# Patient Record
Sex: FEMALE | Race: BLACK OR AFRICAN AMERICAN | URBAN - METROPOLITAN AREA
[De-identification: names, ages, dates, MRNs, and addresses within clinical notes are randomized per-mention and may not be internally consistent; named-entity substitution may affect disease eponyms.]

---

## 2024-01-26 ENCOUNTER — OFFICE VISIT (OUTPATIENT)
Dept: SURGERY | Facility: CLINIC | Age: 64
End: 2024-01-26
Payer: COMMERCIAL

## 2024-01-26 VITALS
WEIGHT: 278.19 LBS | BODY MASS INDEX: 44.18 KG/M2 | DIASTOLIC BLOOD PRESSURE: 78 MMHG | SYSTOLIC BLOOD PRESSURE: 124 MMHG | HEIGHT: 66.5 IN

## 2024-01-26 DIAGNOSIS — Z90.711 HISTORY OF ABDOMINAL SUPRACERVICAL SUBTOTAL HYSTERECTOMY: ICD-10-CM

## 2024-01-26 DIAGNOSIS — Z01.419 WOMEN'S ANNUAL ROUTINE GYNECOLOGICAL EXAMINATION: Primary | ICD-10-CM

## 2024-01-26 DIAGNOSIS — Z12.31 BREAST CANCER SCREENING BY MAMMOGRAM: ICD-10-CM

## 2024-01-26 DIAGNOSIS — Z12.4 SCREENING FOR CERVICAL CANCER: ICD-10-CM

## 2024-01-26 DIAGNOSIS — R23.2 HOT FLASHES: ICD-10-CM

## 2024-01-26 PROCEDURE — 3008F BODY MASS INDEX DOCD: CPT | Performed by: OBSTETRICS & GYNECOLOGY

## 2024-01-26 PROCEDURE — 99386 PREV VISIT NEW AGE 40-64: CPT | Performed by: OBSTETRICS & GYNECOLOGY

## 2024-01-26 PROCEDURE — 99204 OFFICE O/P NEW MOD 45 MIN: CPT | Performed by: OBSTETRICS & GYNECOLOGY

## 2024-01-26 PROCEDURE — 3074F SYST BP LT 130 MM HG: CPT | Performed by: OBSTETRICS & GYNECOLOGY

## 2024-01-26 PROCEDURE — 3078F DIAST BP <80 MM HG: CPT | Performed by: OBSTETRICS & GYNECOLOGY

## 2024-01-26 PROCEDURE — 88175 CYTOPATH C/V AUTO FLUID REDO: CPT | Performed by: OBSTETRICS & GYNECOLOGY

## 2024-01-26 PROCEDURE — 87624 HPV HI-RISK TYP POOLED RSLT: CPT | Performed by: OBSTETRICS & GYNECOLOGY

## 2024-01-26 RX ORDER — FLUTICASONE PROPIONATE AND SALMETEROL XINAFOATE 230; 21 UG/1; UG/1
2 AEROSOL, METERED RESPIRATORY (INHALATION) EVERY 12 HOURS
COMMUNITY
Start: 2022-12-05

## 2024-01-26 RX ORDER — HYDROCHLOROTHIAZIDE 25 MG/1
25 TABLET ORAL EVERY MORNING
COMMUNITY
Start: 2024-01-02

## 2024-01-26 RX ORDER — SEMAGLUTIDE 0.68 MG/ML
0.25 INJECTION, SOLUTION SUBCUTANEOUS
COMMUNITY
Start: 2023-04-15

## 2024-01-26 RX ORDER — AMLODIPINE BESYLATE 10 MG/1
10 TABLET ORAL NIGHTLY
COMMUNITY

## 2024-01-26 RX ORDER — ALBUTEROL SULFATE 90 UG/1
2 AEROSOL, METERED RESPIRATORY (INHALATION)
COMMUNITY
Start: 2022-12-05

## 2024-01-26 RX ORDER — CONJUGATED ESTROGENS 0.62 MG/1
0.62 TABLET, FILM COATED ORAL DAILY
COMMUNITY

## 2024-01-26 RX ORDER — EMPAGLIFLOZIN 10 MG/1
10 TABLET, FILM COATED ORAL DAILY
COMMUNITY

## 2024-01-26 RX ORDER — MONTELUKAST SODIUM 10 MG/1
1 TABLET ORAL NIGHTLY
COMMUNITY
Start: 2023-07-30

## 2024-01-26 RX ORDER — NEBIVOLOL 20 MG/1
20 TABLET ORAL DAILY
COMMUNITY

## 2024-01-26 NOTE — PROGRESS NOTES
NEW GYN H&P     2024  1:14 PM    Chief Complaint   Patient presents with    New Patient     Annual exam/pap     Mammogram Screening     Needs mammo 2024    Other     Concerned about family history of breast cancer.     Night Sweats     Pt c/o hot flashes    .    HPI: Patient is a 63 year old  LMP supracervical hysterectomy here to re-establish care - due for annual exam, PAP, and order for mammogram. Reports concerns about hot flashes since previous provider recently discontinued premarin HRT after many years of use. Flashes are drenching and intense since discontinuation. Recommended trial with non-hormonal hot flash remedy of Sofia 500 mg 1-3 capsules daily along with lifestyle modifications using iced towels to neck and head along with mini-Vornado fan at bedside nightstand. Will call if no improvement in 4-6 weeks to discuss additional treatment options. No other gynecologic concerns or complaints. No pelvic pain. No abnormal vaginal discharge or bleeding.     No LMP recorded. Patient has had a hysterectomy.    OB History    Para Term  AB Living   3 2 2   1 2   SAB IAB Ectopic Multiple Live Births   0 1     2      # Outcome Date GA Lbr Clyde/2nd Weight Sex Delivery Anes PTL Lv   3 Term     M NORMAL SPONT   MEHNAZ   2 Term 76    F NORMAL SPONT   MEHNAZ   1 IAB      THERAPEUTIC          GYN hx:    Hx Prior Abnormal Pap: No  Pap Date: 18  Pap Result Notes: wnl  Follow Up Recommendation: Last Mammo: 2023 @ [facility]  CONTRACEPTION: N/A  LAST MAMMOGRAM: 2023      Current Outpatient Medications   Medication Sig Dispense Refill    fluticasone-salmeterol (ADVAIR HFA) 230-21 MCG/ACT Inhalation Aerosol Inhale 2 puffs into the lungs Q12H.      albuterol 108 (90 Base) MCG/ACT Inhalation Aero Soln Inhale 2 puffs into the lungs.      montelukast 10 MG Oral Tab Take 1 tablet (10 mg total) by mouth nightly.      JARDIANCE 10 MG Oral Tab Take 1 tablet (10 mg total) by mouth daily.       semaglutide (OZEMPIC, 0.25 OR 0.5 MG/DOSE,) 2 MG/3ML Subcutaneous Solution Pen-injector Inject 0.25 mg into the skin.      Nebivolol HCl 20 MG Oral Tab Take 1 tablet (20 mg total) by mouth daily.      amLODIPine 10 MG Oral Tab Take 1 tablet (10 mg total) by mouth nightly.      hydroCHLOROthiazide 25 MG Oral Tab Take 1 tablet (25 mg total) by mouth every morning.      PREMARIN 0.625 MG Oral Tab Take 1 tablet (0.625 mg total) by mouth daily.         Past Medical History:   Diagnosis Date    Asthma     COVID 12/2023    Diabetes mellitus (HCC)     Exposure to TB     Family history of breast cancer     with Mat. aunt and mat. cousin    Fibroids     Hypertension      Past Surgical History:   Procedure Laterality Date    HYSTERECTOMY      part. hyst    IR UTERINE FIBROID EMBOLIZATION      LAPAROSCOPY,PELVIC,BIOPSY      to remove IUD    OTHER SURGICAL HISTORY      large cyst removed from back     Allergies   Allergen Reactions    Cefuroxime OTHER (SEE COMMENTS), SWELLING and UNKNOWN     Makes throat and face swell    Tongue swelling    Ace Inhibitors OTHER (SEE COMMENTS) and UNKNOWN     Makes throat and face swell    Trouble breathing and swelling of the throat   Makes throat and face swell    Chlorpheniramine-Phenylephrine UNKNOWN     Dusts up    Uncaria Tomentosa, Cats Claw OTHER (SEE COMMENTS) and UNKNOWN    Cat Hair Extract Coughing     Swelling    Cholestatin Coughing    Milk-Related Compounds DIARRHEA     Family History   Problem Relation Age of Onset    Prostate Cancer Father     Diabetes Father     Diabetes Mother     Breast Cancer Maternal Aunt     Pancreatic Cancer Maternal Aunt     Cancer Maternal Aunt     Breast Cancer Maternal Cousin     Colon Cancer Neg     Uterine Cancer Neg     Ovarian Cancer Neg      Social History     Tobacco Use    Smoking status: Never    Smokeless tobacco: Never   Substance and Sexual Activity    Alcohol use: Yes     Comment: occ    Drug use: Never    Sexual activity: Yes     Partners:  Male     Social History     Social History Narrative    Not on file       ROS:     Review of Systems:  A comprehensive 10 point ROS was completed. All pertinent positives and negatives noted in the HPI.     /78   Ht 66.5\"   Wt 278 lb 3.2 oz (126.2 kg)   BMI 44.23 kg/m²     Exam:   GENERAL: well developed, well nourished, in no apparent distress  SKIN: no rashes, no lesions  HEENT: normal  LUNGS: respiration unlabored  CARDIOVASCULAR: no peripheral edema or varicosities, skin warm and dry  BREASTS: bilaterally nontender, no palpable masses, no nipple discharge, no skin changes, no axillary adenopathy  ABDOMEN: Soft, non distended; non tender, no masses  GYNE/:   External Genitalia: normal, no lesions, good perineal support  Urethra: meatus normal   Bladder: well supported  Vagina: normal mucosa, no lesions, no discharge   Uterus: absent  Cervix: normal os, no lesions or bleeding  Adnexa: absent  Cul-de-sac: normal  R/V: normal perineum, no hemorrhoids  EXTREMITIES:  nontender without edema      A/P: Patient is 63 year old female     1. Women's annual routine gynecological examination  - Normal    2. Hot flashes - recent ERT discontinued  - Start oral Sofia 500 mg 1-3 tablets daily  - Bedside fan  - Call if no improvement in 6 weeks    3. History of abdominal supracervical subtotal hysterectomy  - PAP today    4. Breast cancer screening by mammogram  - Mammogram ordered      Total time spent = 45 minutes  >50% visit = face to face counseling and coordination of care        1/26/2024  Delaney Keller MD

## 2024-01-29 LAB — HPV I/H RISK 1 DNA SPEC QL NAA+PROBE: NEGATIVE

## 2024-02-01 ENCOUNTER — LAB ENCOUNTER (OUTPATIENT)
Dept: LAB | Facility: REFERENCE LAB | Age: 64
End: 2024-02-01
Attending: NURSE PRACTITIONER
Payer: COMMERCIAL

## 2024-02-01 ENCOUNTER — OFFICE VISIT (OUTPATIENT)
Dept: FAMILY MEDICINE CLINIC | Facility: CLINIC | Age: 64
End: 2024-02-01
Payer: COMMERCIAL

## 2024-02-01 VITALS
BODY MASS INDEX: 43.83 KG/M2 | SYSTOLIC BLOOD PRESSURE: 132 MMHG | OXYGEN SATURATION: 98 % | HEIGHT: 66.5 IN | TEMPERATURE: 97 F | WEIGHT: 276 LBS | DIASTOLIC BLOOD PRESSURE: 84 MMHG | HEART RATE: 68 BPM

## 2024-02-01 DIAGNOSIS — Z85.43 HISTORY OF OVARIAN CANCER: ICD-10-CM

## 2024-02-01 DIAGNOSIS — E11.9 TYPE 2 DIABETES MELLITUS WITHOUT COMPLICATION, WITHOUT LONG-TERM CURRENT USE OF INSULIN (HCC): ICD-10-CM

## 2024-02-01 DIAGNOSIS — E87.6 HYPOKALEMIA: ICD-10-CM

## 2024-02-01 DIAGNOSIS — I10 ESSENTIAL HYPERTENSION, BENIGN: ICD-10-CM

## 2024-02-01 DIAGNOSIS — Z12.11 SCREENING FOR MALIGNANT NEOPLASM OF COLON: ICD-10-CM

## 2024-02-01 DIAGNOSIS — Z23 ENCOUNTER FOR IMMUNIZATION: ICD-10-CM

## 2024-02-01 DIAGNOSIS — E55.9 VITAMIN D DEFICIENCY: ICD-10-CM

## 2024-02-01 DIAGNOSIS — Z00.00 ADULT GENERAL MEDICAL EXAMINATION: ICD-10-CM

## 2024-02-01 DIAGNOSIS — Z00.00 ADULT GENERAL MEDICAL EXAMINATION: Primary | ICD-10-CM

## 2024-02-01 DIAGNOSIS — J45.30 MILD PERSISTENT ASTHMA WITHOUT COMPLICATION: ICD-10-CM

## 2024-02-01 DIAGNOSIS — Z12.31 ENCOUNTER FOR SCREENING MAMMOGRAM FOR MALIGNANT NEOPLASM OF BREAST: ICD-10-CM

## 2024-02-01 PROBLEM — D49.59: Status: RESOLVED | Noted: 2024-02-01 | Resolved: 2024-02-01

## 2024-02-01 PROBLEM — D49.59: Status: ACTIVE | Noted: 2024-02-01

## 2024-02-01 PROBLEM — R03.0 FINDING OF ABOVE NORMAL BLOOD PRESSURE: Status: RESOLVED | Noted: 2024-02-01 | Resolved: 2024-02-01

## 2024-02-01 PROBLEM — J30.2 SEASONAL ALLERGIC RHINITIS: Status: ACTIVE | Noted: 2017-04-10

## 2024-02-01 PROBLEM — Z01.419 WOMEN'S ANNUAL ROUTINE GYNECOLOGICAL EXAMINATION: Status: RESOLVED | Noted: 2024-01-26 | Resolved: 2024-02-01

## 2024-02-01 PROBLEM — R03.0 FINDING OF ABOVE NORMAL BLOOD PRESSURE: Status: ACTIVE | Noted: 2024-02-01

## 2024-02-01 PROBLEM — N95.1 SYMPTOMATIC MENOPAUSAL OR FEMALE CLIMACTERIC STATES: Status: ACTIVE | Noted: 2022-01-18

## 2024-02-01 LAB
ALBUMIN SERPL-MCNC: 4.7 G/DL (ref 3.2–4.8)
ALBUMIN/GLOB SERPL: 1.4 {RATIO} (ref 1–2)
ALP LIVER SERPL-CCNC: 51 U/L
ALT SERPL-CCNC: 15 U/L
ANION GAP SERPL CALC-SCNC: 9 MMOL/L (ref 0–18)
AST SERPL-CCNC: 17 U/L (ref ?–34)
BASOPHILS # BLD AUTO: 0.04 X10(3) UL (ref 0–0.2)
BASOPHILS NFR BLD AUTO: 0.8 %
BILIRUB SERPL-MCNC: 0.7 MG/DL (ref 0.2–1.1)
BUN BLD-MCNC: 9 MG/DL (ref 9–23)
BUN/CREAT SERPL: 13.2 (ref 10–20)
CALCIUM BLD-MCNC: 10.5 MG/DL (ref 8.7–10.4)
CHLORIDE SERPL-SCNC: 106 MMOL/L (ref 98–112)
CHOLEST SERPL-MCNC: 166 MG/DL (ref ?–200)
CO2 SERPL-SCNC: 25 MMOL/L (ref 21–32)
CREAT BLD-MCNC: 0.68 MG/DL
CREAT UR-SCNC: 86.8 MG/DL
DEPRECATED RDW RBC AUTO: 41.5 FL (ref 35.1–46.3)
EGFRCR SERPLBLD CKD-EPI 2021: 98 ML/MIN/1.73M2 (ref 60–?)
EOSINOPHIL # BLD AUTO: 0.14 X10(3) UL (ref 0–0.7)
EOSINOPHIL NFR BLD AUTO: 2.8 %
ERYTHROCYTE [DISTWIDTH] IN BLOOD BY AUTOMATED COUNT: 12.7 % (ref 11–15)
EST. AVERAGE GLUCOSE BLD GHB EST-MCNC: 169 MG/DL (ref 68–126)
FASTING PATIENT LIPID ANSWER: YES
FASTING STATUS PATIENT QL REPORTED: YES
GLOBULIN PLAS-MCNC: 3.4 G/DL (ref 2.8–4.4)
GLUCOSE BLD-MCNC: 183 MG/DL (ref 70–99)
HBA1C MFR BLD: 7.5 % (ref ?–5.7)
HCT VFR BLD AUTO: 39.9 %
HDLC SERPL-MCNC: 61 MG/DL (ref 40–59)
HGB BLD-MCNC: 13.1 G/DL
IMM GRANULOCYTES # BLD AUTO: 0.02 X10(3) UL (ref 0–1)
IMM GRANULOCYTES NFR BLD: 0.4 %
LDLC SERPL CALC-MCNC: 87 MG/DL (ref ?–100)
LYMPHOCYTES # BLD AUTO: 1.75 X10(3) UL (ref 1–4)
LYMPHOCYTES NFR BLD AUTO: 34.5 %
MCH RBC QN AUTO: 29.2 PG (ref 26–34)
MCHC RBC AUTO-ENTMCNC: 32.8 G/DL (ref 31–37)
MCV RBC AUTO: 88.9 FL
MICROALBUMIN UR-MCNC: <0.3 MG/DL
MONOCYTES # BLD AUTO: 0.34 X10(3) UL (ref 0.1–1)
MONOCYTES NFR BLD AUTO: 6.7 %
NEUTROPHILS # BLD AUTO: 2.78 X10 (3) UL (ref 1.5–7.7)
NEUTROPHILS # BLD AUTO: 2.78 X10(3) UL (ref 1.5–7.7)
NEUTROPHILS NFR BLD AUTO: 54.8 %
NONHDLC SERPL-MCNC: 105 MG/DL (ref ?–130)
OSMOLALITY SERPL CALC.SUM OF ELEC: 293 MOSM/KG (ref 275–295)
PLATELET # BLD AUTO: 193 10(3)UL (ref 150–450)
POTASSIUM SERPL-SCNC: 3.4 MMOL/L (ref 3.5–5.1)
PROT SERPL-MCNC: 8.1 G/DL (ref 5.7–8.2)
RBC # BLD AUTO: 4.49 X10(6)UL
SODIUM SERPL-SCNC: 140 MMOL/L (ref 136–145)
TRIGL SERPL-MCNC: 101 MG/DL (ref 30–149)
TSI SER-ACNC: 1.14 MIU/ML (ref 0.55–4.78)
VIT D+METAB SERPL-MCNC: 31.1 NG/ML (ref 30–100)
VLDLC SERPL CALC-MCNC: 16 MG/DL (ref 0–30)
WBC # BLD AUTO: 5.1 X10(3) UL (ref 4–11)

## 2024-02-01 PROCEDURE — 84443 ASSAY THYROID STIM HORMONE: CPT

## 2024-02-01 PROCEDURE — 36415 COLL VENOUS BLD VENIPUNCTURE: CPT

## 2024-02-01 PROCEDURE — 83036 HEMOGLOBIN GLYCOSYLATED A1C: CPT

## 2024-02-01 PROCEDURE — 80053 COMPREHEN METABOLIC PANEL: CPT

## 2024-02-01 PROCEDURE — 90686 IIV4 VACC NO PRSV 0.5 ML IM: CPT | Performed by: NURSE PRACTITIONER

## 2024-02-01 PROCEDURE — 99396 PREV VISIT EST AGE 40-64: CPT | Performed by: NURSE PRACTITIONER

## 2024-02-01 PROCEDURE — 82306 VITAMIN D 25 HYDROXY: CPT

## 2024-02-01 PROCEDURE — 80061 LIPID PANEL: CPT

## 2024-02-01 PROCEDURE — 82570 ASSAY OF URINE CREATININE: CPT

## 2024-02-01 PROCEDURE — 82043 UR ALBUMIN QUANTITATIVE: CPT

## 2024-02-01 PROCEDURE — 85025 COMPLETE CBC W/AUTO DIFF WBC: CPT

## 2024-02-01 PROCEDURE — 99214 OFFICE O/P EST MOD 30 MIN: CPT | Performed by: NURSE PRACTITIONER

## 2024-02-01 PROCEDURE — 90471 IMMUNIZATION ADMIN: CPT | Performed by: NURSE PRACTITIONER

## 2024-02-01 RX ORDER — ALBUTEROL SULFATE 90 UG/1
2 AEROSOL, METERED RESPIRATORY (INHALATION) EVERY 6 HOURS PRN
Qty: 1 EACH | Refills: 3 | Status: SHIPPED | OUTPATIENT
Start: 2024-02-01

## 2024-02-01 RX ORDER — MONTELUKAST SODIUM 10 MG/1
10 TABLET ORAL NIGHTLY
Qty: 90 TABLET | Refills: 1 | Status: SHIPPED | OUTPATIENT
Start: 2024-02-01

## 2024-02-01 RX ORDER — POTASSIUM CHLORIDE 1500 MG/1
20 TABLET, EXTENDED RELEASE ORAL DAILY
Qty: 3 TABLET | Refills: 0 | Status: SHIPPED | OUTPATIENT
Start: 2024-02-01 | End: 2024-02-04

## 2024-02-01 RX ORDER — HYDROCHLOROTHIAZIDE 25 MG/1
25 TABLET ORAL EVERY MORNING
Qty: 90 TABLET | Refills: 1 | Status: SHIPPED | OUTPATIENT
Start: 2024-02-01

## 2024-02-01 RX ORDER — AMLODIPINE BESYLATE 10 MG/1
10 TABLET ORAL NIGHTLY
Qty: 90 TABLET | Refills: 1 | Status: SHIPPED | OUTPATIENT
Start: 2024-02-01

## 2024-02-01 RX ORDER — EMPAGLIFLOZIN 10 MG/1
10 TABLET, FILM COATED ORAL DAILY
Qty: 90 TABLET | Refills: 1 | Status: SHIPPED | OUTPATIENT
Start: 2024-02-01

## 2024-02-01 RX ORDER — FLUTICASONE PROPIONATE AND SALMETEROL XINAFOATE 230; 21 UG/1; UG/1
2 AEROSOL, METERED RESPIRATORY (INHALATION) EVERY 12 HOURS
Qty: 1 EACH | Refills: 6 | Status: SHIPPED | OUTPATIENT
Start: 2024-02-01

## 2024-02-01 RX ORDER — NEBIVOLOL 20 MG/1
20 TABLET ORAL DAILY
Qty: 90 TABLET | Refills: 1 | Status: SHIPPED | OUTPATIENT
Start: 2024-02-01

## 2024-02-01 NOTE — PROGRESS NOTES
Chief Complaint:   Chief Complaint   Patient presents with    Establish Care       HPI:   This is a 63 year old female coming in for physical, to establish care, and for several concerns. Hx of DM2, on Jardiance, does not monitor glucose regularly. Due for A1C, eye exam, urine for microalbumin/creatinine. Was prescribed Ozempic at one point by a previous provider but never took it. She is interested in considering an injectable medicine in the future. Hx HTN, BP stable. Reports good compliance with medicines.     Has been out of her asthma medication for a few weeks now. Feels like she coughs sometimes, denies dyspnea or nighttime awakenings with symptoms. Had not been using Singulair regularly because she thought it was not for asthma. Hx ovarian cancer s/p oopherectomy, sees gynecology. Due for mammogram, colon cancer screening.     Results for orders placed or performed in visit on 01/26/24   Hpv Dna  High Risk , Thin Prep Collect   Result Value Ref Range    HPV High Risk Negative Negative    HPV Source Vaginal/cervical              Past Medical History:   Diagnosis Date    Asthma     Biliary calculus     COVID 12/2023    Diabetes mellitus (HCC)     Exposure to TB     Family history of breast cancer     with Mat. aunt and mat. cousin    Fibroids     Hypertension     Ovarian cancer (HCC)      Past Surgical History:   Procedure Laterality Date    HYSTERECTOMY      supracervical; fibroids + borderline mucinous ovarian    IR UTERINE FIBROID EMBOLIZATION      LAPAROSCOPY,PELVIC,BIOPSY      to remove IUD    OTHER SURGICAL HISTORY      large cyst removed from back     Social History:  Social History     Socioeconomic History    Marital status: Single   Tobacco Use    Smoking status: Never    Smokeless tobacco: Never   Substance and Sexual Activity    Alcohol use: Yes     Comment: occ    Drug use: Never    Sexual activity: Yes     Partners: Male     Family History:  Family History   Problem Relation Age of Onset     Prostate Cancer Father     Diabetes Father     Diabetes Mother     Breast Cancer Maternal Aunt     Pancreatic Cancer Maternal Aunt     Cancer Maternal Aunt     Breast Cancer Maternal Cousin     Colon Cancer Neg     Uterine Cancer Neg     Ovarian Cancer Neg      Allergies:  Allergies   Allergen Reactions    Cefuroxime OTHER (SEE COMMENTS), SWELLING and UNKNOWN     Makes throat and face swell    Tongue swelling    Ace Inhibitors OTHER (SEE COMMENTS) and UNKNOWN     Makes throat and face swell    Trouble breathing and swelling of the throat   Makes throat and face swell    Chlorpheniramine-Phenylephrine UNKNOWN     Dusts up    Uncaria Tomentosa, Cats Claw OTHER (SEE COMMENTS) and UNKNOWN    Cat Hair Extract Coughing     Swelling    Cholestatin Coughing    Milk-Related Compounds DIARRHEA    Nickel RASH     Current Meds:  Current Outpatient Medications   Medication Sig Dispense Refill    fluticasone-salmeterol (ADVAIR HFA) 230-21 MCG/ACT Inhalation Aerosol Inhale 2 puffs into the lungs Q12H. 1 each 6    albuterol 108 (90 Base) MCG/ACT Inhalation Aero Soln Inhale 2 puffs into the lungs every 6 (six) hours as needed for Wheezing. 1 each 3    amLODIPine 10 MG Oral Tab Take 1 tablet (10 mg total) by mouth nightly. 90 tablet 1    Nebivolol HCl 20 MG Oral Tab Take 1 tablet (20 mg total) by mouth daily. 90 tablet 1    hydroCHLOROthiazide 25 MG Oral Tab Take 1 tablet (25 mg total) by mouth every morning. 90 tablet 1    JARDIANCE 10 MG Oral Tab Take 1 tablet (10 mg total) by mouth daily. 90 tablet 1    montelukast 10 MG Oral Tab Take 1 tablet (10 mg total) by mouth nightly. 90 tablet 1      Counseling given: Not Answered       REVIEW OF SYSTEMS:   CONSTITUTIONAL:  Denies unusual weight gain/loss, fever, chills, or fatigue.  HEENT:  Eyes:  Denies eye pain, visual loss, blurred vision. Denies hearing loss, sneezing, congestion, runny nose or sore throat.  INTEGUMENTARY:  Denies rashes, itching, skin lesion.  CARDIOVASCULAR:  Denies  chest pain, palpitations, edema, dyspnea on exertion or at rest.  RESPIRATORY:  Denies shortness of breath, wheezing. Coughs at times since being out of asthma medicine, drinks water and this goes away.  GASTROINTESTINAL:  Denies abdominal pain, nausea, vomiting, constipation, diarrhea, or blood in stool.  GENITOURINARY: Denies dysuria, hematuria, frequency.  MUSCULOSKELETAL:  Denies weakness, muscle aches, back pain, joint pain, swelling or stiffness.  NEUROLOGICAL:  Denies headache, seizures, dizziness.  PSYCHIATRIC:  Denies depression or anxiety. Denies suicidal thoughts.    EXAM:   /84 (BP Location: Right arm, Patient Position: Sitting, Cuff Size: large)   Pulse 68   Temp 97.1 °F (36.2 °C) (Temporal)   Ht 5' 6.5\" (1.689 m)   Wt 276 lb (125.2 kg)   SpO2 98%   BMI 43.88 kg/m²  Estimated body mass index is 43.88 kg/m² as calculated from the following:    Height as of this encounter: 5' 6.5\" (1.689 m).    Weight as of this encounter: 276 lb (125.2 kg).   Vital signs reviewed.Appears stated age, well groomed, in no acute distress.  Physical Exam:  GEN:  Patient is alert, awake and oriented, well developed, well nourished.  HEENT:  Head:  Normocephalic, atraumatic Eyes: EOMI, PERRLA, conjunctivae clear bilaterally, no eye discharge Ears: External normal, TM clear bilaterally. Nose: patent, no nasal discharge. Throat:  No tonsillar erythema or exudate.  Mouth:  No oral lesions, good dentition.  NECK: Supple, no CLAD, no thyromegaly.  SKIN: No rashes, no skin lesion, no bruising, good turgor.  HEART:  Regular rate and rhythm, no murmurs, rubs or gallops.  LUNGS: Clear to auscultation bilterally, no rales/rhonchi/wheezing.  ABDOMEN:  Soft, nondistended, nontender, bowel sounds normal in all 4 quadrants, no masses, no hepatosplenomegaly.  BACK: No tenderness to palpation, FROM.  EXTREMITIES:  No edema, no cyanosis, no clubbing, FROM, 2+ dorsalis pedis pulses bilaterally.  NEURO:  No deficit, normal gait,  strength and tone, sensory intact, normal reflexes.    ASSESSMENT AND PLAN:   1. Adult general medical examination  -Healthy diet and regular exercise.  -Annual eye exam and biannual dental visits.  -Labs as below.  - CBC With Differential With Platelet; Future  - Comp Metabolic Panel (14); Future  - Hemoglobin A1C; Future  - Lipid Panel; Future  - Assay, Thyroid Stim Hormone; Future    2. Type 2 diabetes mellitus without complication, without long-term current use of insulin (HCC)  -Will CPM for now. Check A1C, urine test today. Referred to ophthalmology.  -Diet/exercise reinforced.  -See me 2 weeks to review test results and discuss possible injectable medication.  - JARDIANCE 10 MG Oral Tab; Take 1 tablet (10 mg total) by mouth daily.  Dispense: 90 tablet; Refill: 1  - Hemoglobin A1C; Future  - Ophthalmology Referral - In Network  - Microalb/Creat Ratio, Random Urine; Future    3. Essential hypertension, benign  -Stable, CPM.  - amLODIPine 10 MG Oral Tab; Take 1 tablet (10 mg total) by mouth nightly.  Dispense: 90 tablet; Refill: 1  - Nebivolol HCl 20 MG Oral Tab; Take 1 tablet (20 mg total) by mouth daily.  Dispense: 90 tablet; Refill: 1  - hydroCHLOROthiazide 25 MG Oral Tab; Take 1 tablet (25 mg total) by mouth every morning.  Dispense: 90 tablet; Refill: 1  - CBC With Differential With Platelet; Future  - Comp Metabolic Panel (14); Future    4. Mild persistent asthma without complication  -Reinforced taking Singulair nightly for best results. CPM.   - fluticasone-salmeterol (ADVAIR HFA) 230-21 MCG/ACT Inhalation Aerosol; Inhale 2 puffs into the lungs Q12H.  Dispense: 1 each; Refill: 6  - albuterol 108 (90 Base) MCG/ACT Inhalation Aero Soln; Inhale 2 puffs into the lungs every 6 (six) hours as needed for Wheezing.  Dispense: 1 each; Refill: 3  - montelukast 10 MG Oral Tab; Take 1 tablet (10 mg total) by mouth nightly.  Dispense: 90 tablet; Refill: 1    5. History of ovarian cancer  -Sees gynecology.    6.  Vitamin D deficiency  -Will check labs, no ton supplement.  - Vitamin D; Future    7. Encounter for screening mammogram for malignant neoplasm of breast  -Mammogram ordered by Dr. Keller.    8. Encounter for immunization  - FLULAVAL INFLUENZA VACCINE QUAD PRESERVATIVE FREE 0.5 ML    9. Screening for malignant neoplasm of colon  -Prefers colonoscopy, referral placed.   - Gastro Referral - In Network      Meds & Refills for this Visit:  Requested Prescriptions     Signed Prescriptions Disp Refills    fluticasone-salmeterol (ADVAIR HFA) 230-21 MCG/ACT Inhalation Aerosol 1 each 6     Sig: Inhale 2 puffs into the lungs Q12H.    albuterol 108 (90 Base) MCG/ACT Inhalation Aero Soln 1 each 3     Sig: Inhale 2 puffs into the lungs every 6 (six) hours as needed for Wheezing.    amLODIPine 10 MG Oral Tab 90 tablet 1     Sig: Take 1 tablet (10 mg total) by mouth nightly.    Nebivolol HCl 20 MG Oral Tab 90 tablet 1     Sig: Take 1 tablet (20 mg total) by mouth daily.    hydroCHLOROthiazide 25 MG Oral Tab 90 tablet 1     Sig: Take 1 tablet (25 mg total) by mouth every morning.    JARDIANCE 10 MG Oral Tab 90 tablet 1     Sig: Take 1 tablet (10 mg total) by mouth daily.    montelukast 10 MG Oral Tab 90 tablet 1     Sig: Take 1 tablet (10 mg total) by mouth nightly.         Problem List:  Patient Active Problem List   Diagnosis    Hot flashes    History of abdominal supracervical subtotal hysterectomy    Diabetes mellitus, new onset (HCC)    Essential hypertension, benign    Thyroid nodule    Mild persistent asthma without complication    Seasonal allergic rhinitis    Symptomatic menopausal or female climacteric states    Vitamin D deficiency    Nonspecific reaction to tuberculin skin test       Tamie Mcfarlane, APRN  2/1/2024  9:53 AM

## 2024-02-15 ENCOUNTER — LAB ENCOUNTER (OUTPATIENT)
Dept: LAB | Facility: REFERENCE LAB | Age: 64
End: 2024-02-15
Attending: NURSE PRACTITIONER
Payer: COMMERCIAL

## 2024-02-15 ENCOUNTER — OFFICE VISIT (OUTPATIENT)
Dept: FAMILY MEDICINE CLINIC | Facility: CLINIC | Age: 64
End: 2024-02-15
Payer: COMMERCIAL

## 2024-02-15 VITALS
HEIGHT: 67 IN | OXYGEN SATURATION: 96 % | SYSTOLIC BLOOD PRESSURE: 148 MMHG | DIASTOLIC BLOOD PRESSURE: 80 MMHG | WEIGHT: 278.81 LBS | HEART RATE: 78 BPM | BODY MASS INDEX: 43.76 KG/M2

## 2024-02-15 DIAGNOSIS — E11.9 DIABETES MELLITUS, NEW ONSET (HCC): Primary | ICD-10-CM

## 2024-02-15 DIAGNOSIS — E87.6 HYPOKALEMIA: ICD-10-CM

## 2024-02-15 DIAGNOSIS — J45.30 MILD PERSISTENT ASTHMA WITHOUT COMPLICATION: ICD-10-CM

## 2024-02-15 DIAGNOSIS — E04.1 THYROID NODULE: ICD-10-CM

## 2024-02-15 DIAGNOSIS — E55.9 VITAMIN D DEFICIENCY: ICD-10-CM

## 2024-02-15 DIAGNOSIS — I10 ESSENTIAL HYPERTENSION, BENIGN: ICD-10-CM

## 2024-02-15 LAB
ANION GAP SERPL CALC-SCNC: 6 MMOL/L (ref 0–18)
BUN BLD-MCNC: 13 MG/DL (ref 9–23)
BUN/CREAT SERPL: 21 (ref 10–20)
CALCIUM BLD-MCNC: 9.9 MG/DL (ref 8.7–10.4)
CHLORIDE SERPL-SCNC: 109 MMOL/L (ref 98–112)
CO2 SERPL-SCNC: 26 MMOL/L (ref 21–32)
CREAT BLD-MCNC: 0.62 MG/DL
EGFRCR SERPLBLD CKD-EPI 2021: 100 ML/MIN/1.73M2 (ref 60–?)
FASTING STATUS PATIENT QL REPORTED: YES
GLUCOSE BLD-MCNC: 203 MG/DL (ref 70–99)
OSMOLALITY SERPL CALC.SUM OF ELEC: 298 MOSM/KG (ref 275–295)
POTASSIUM SERPL-SCNC: 3.9 MMOL/L (ref 3.5–5.1)
SODIUM SERPL-SCNC: 141 MMOL/L (ref 136–145)

## 2024-02-15 PROCEDURE — 36415 COLL VENOUS BLD VENIPUNCTURE: CPT

## 2024-02-15 PROCEDURE — 99214 OFFICE O/P EST MOD 30 MIN: CPT | Performed by: NURSE PRACTITIONER

## 2024-02-15 PROCEDURE — 80048 BASIC METABOLIC PNL TOTAL CA: CPT

## 2024-02-15 RX ORDER — SEMAGLUTIDE 0.68 MG/ML
0.25 INJECTION, SOLUTION SUBCUTANEOUS WEEKLY
Qty: 3 ML | Refills: 2 | Status: SHIPPED | OUTPATIENT
Start: 2024-02-15

## 2024-02-15 NOTE — PROGRESS NOTES
Chief Complaint:   Chief Complaint   Patient presents with    Follow - Up       HPI:   This is a 63 year old female coming in for follow-up. Reviewed labs with her. A1C 7.5%. She currently takes Jardiance 10mg daily. Interested in trying Ozempic. Hx HTN, did not take hydrochlorothiazide this morning. Checks BP at home and reports she is normally OK. Hx mild asthma, takes albuterol PRN. Has not been able to  Advair-pharmacy tells her that they do not have it, she is not sure what the issue is. Hx thyroid nodule, due for ultrasound.     Results for orders placed or performed in visit on 02/01/24   Comp Metabolic Panel (14)   Result Value Ref Range    Glucose 183 (H) 70 - 99 mg/dL    Sodium 140 136 - 145 mmol/L    Potassium 3.4 (L) 3.5 - 5.1 mmol/L    Chloride 106 98 - 112 mmol/L    CO2 25.0 21.0 - 32.0 mmol/L    Anion Gap 9 0 - 18 mmol/L    BUN 9 9 - 23 mg/dL    Creatinine 0.68 0.55 - 1.02 mg/dL    BUN/CREA Ratio 13.2 10.0 - 20.0    Calcium, Total 10.5 (H) 8.7 - 10.4 mg/dL    Calculated Osmolality 293 275 - 295 mOsm/kg    eGFR-Cr 98 >=60 mL/min/1.73m2    ALT 15 10 - 49 U/L    AST 17 <=34 U/L    Alkaline Phosphatase 51 50 - 130 U/L    Bilirubin, Total 0.7 0.2 - 1.1 mg/dL    Total Protein 8.1 5.7 - 8.2 g/dL    Albumin 4.7 3.2 - 4.8 g/dL    Globulin  3.4 2.8 - 4.4 g/dL    A/G Ratio 1.4 1.0 - 2.0    Patient Fasting for CMP? Yes    Hemoglobin A1C   Result Value Ref Range    HgbA1C 7.5 (H) <5.7 %    Estimated Average Glucose 169 (H) 68 - 126 mg/dL   Lipid Panel   Result Value Ref Range    Cholesterol, Total 166 <200 mg/dL    HDL Cholesterol 61 (H) 40 - 59 mg/dL    Triglycerides 101 30 - 149 mg/dL    LDL Cholesterol 87 <100 mg/dL    VLDL 16 0 - 30 mg/dL    Non HDL Chol 105 <130 mg/dL    Patient Fasting for Lipid? Yes    Assay, Thyroid Stim Hormone   Result Value Ref Range    TSH 1.144 0.550 - 4.780 mIU/mL   Microalb/Creat Ratio, Random Urine   Result Value Ref Range    Microalbumin, Urine <0.30 mg/dL    Creatinine Ur  Random 86.80 mg/dL    Malb/Cre Calc     Vitamin D, 25-Hydroxy   Result Value Ref Range    Vitamin D, 25OH, Total 31.1 30.0 - 100.0 ng/mL   CBC W/ DIFFERENTIAL   Result Value Ref Range    WBC 5.1 4.0 - 11.0 x10(3) uL    RBC 4.49 3.80 - 5.30 x10(6)uL    HGB 13.1 12.0 - 16.0 g/dL    HCT 39.9 35.0 - 48.0 %    MCV 88.9 80.0 - 100.0 fL    MCH 29.2 26.0 - 34.0 pg    MCHC 32.8 31.0 - 37.0 g/dL    RDW-SD 41.5 35.1 - 46.3 fL    RDW 12.7 11.0 - 15.0 %    .0 150.0 - 450.0 10(3)uL    Neutrophil Absolute Prelim 2.78 1.50 - 7.70 x10 (3) uL    Neutrophil Absolute 2.78 1.50 - 7.70 x10(3) uL    Lymphocyte Absolute 1.75 1.00 - 4.00 x10(3) uL    Monocyte Absolute 0.34 0.10 - 1.00 x10(3) uL    Eosinophil Absolute 0.14 0.00 - 0.70 x10(3) uL    Basophil Absolute 0.04 0.00 - 0.20 x10(3) uL    Immature Granulocyte Absolute 0.02 0.00 - 1.00 x10(3) uL    Neutrophil % 54.8 %    Lymphocyte % 34.5 %    Monocyte % 6.7 %    Eosinophil % 2.8 %    Basophil % 0.8 %    Immature Granulocyte % 0.4 %             Past Medical History:   Diagnosis Date    Asthma     Biliary calculus     COVID 12/2023    Diabetes mellitus (HCC)     Exposure to TB     Family history of breast cancer     with Mat. aunt and mat. cousin    Fibroids     Hypertension     Ovarian cancer (HCC)      Past Surgical History:   Procedure Laterality Date    HYSTERECTOMY      supracervical; fibroids + borderline mucinous ovarian    IR UTERINE FIBROID EMBOLIZATION      LAPAROSCOPY,PELVIC,BIOPSY      to remove IUD    OTHER SURGICAL HISTORY      large cyst removed from back     Social History:  Social History     Socioeconomic History    Marital status: Single   Tobacco Use    Smoking status: Never    Smokeless tobacco: Never   Substance and Sexual Activity    Alcohol use: Yes     Comment: occ    Drug use: Never    Sexual activity: Yes     Partners: Male     Family History:  Family History   Problem Relation Age of Onset    Prostate Cancer Father     Diabetes Father     Diabetes  Mother     Breast Cancer Maternal Aunt     Pancreatic Cancer Maternal Aunt     Cancer Maternal Aunt     Breast Cancer Maternal Cousin     Colon Cancer Neg     Uterine Cancer Neg     Ovarian Cancer Neg      Allergies:  Allergies   Allergen Reactions    Cefuroxime OTHER (SEE COMMENTS), SWELLING and UNKNOWN     Makes throat and face swell    Tongue swelling    Ace Inhibitors OTHER (SEE COMMENTS) and UNKNOWN     Makes throat and face swell    Trouble breathing and swelling of the throat   Makes throat and face swell    Chlorpheniramine-Phenylephrine UNKNOWN     Dusts up    Uncaria Tomentosa, Cats Claw OTHER (SEE COMMENTS) and UNKNOWN    Cat Hair Extract Coughing     Swelling    Cholestatin Coughing    Milk-Related Compounds DIARRHEA    Nickel RASH     Current Meds:  Current Outpatient Medications   Medication Sig Dispense Refill    semaglutide (OZEMPIC, 0.25 OR 0.5 MG/DOSE,) 2 MG/3ML Subcutaneous Solution Pen-injector Inject 0.25 mg into the skin once a week. May increase to 0.5mg weekly after 4 weeks. 3 mL 2    fluticasone-salmeterol (ADVAIR HFA) 230-21 MCG/ACT Inhalation Aerosol Inhale 2 puffs into the lungs Q12H. 1 each 6    albuterol 108 (90 Base) MCG/ACT Inhalation Aero Soln Inhale 2 puffs into the lungs every 6 (six) hours as needed for Wheezing. 1 each 3    amLODIPine 10 MG Oral Tab Take 1 tablet (10 mg total) by mouth nightly. 90 tablet 1    Nebivolol HCl 20 MG Oral Tab Take 1 tablet (20 mg total) by mouth daily. 90 tablet 1    hydroCHLOROthiazide 25 MG Oral Tab Take 1 tablet (25 mg total) by mouth every morning. 90 tablet 1    JARDIANCE 10 MG Oral Tab Take 1 tablet (10 mg total) by mouth daily. 90 tablet 1    montelukast 10 MG Oral Tab Take 1 tablet (10 mg total) by mouth nightly. 90 tablet 1      Counseling given: No       REVIEW OF SYSTEMS:   CONSTITUTIONAL:  Denies unusual weight gain/loss, fever, chills, or fatigue.  INTEGUMENTARY:  Denies rashes, itching, skin lesion.  CARDIOVASCULAR:  Denies chest pain,  palpitations, edema, dyspnea on exertion or at rest.  RESPIRATORY:  Denies shortness of breath, wheezing, cough or sputum.  NEUROLOGICAL:  Denies headache, seizures, dizziness.    EXAM:   /80   Pulse 78   Ht 5' 7\" (1.702 m)   Wt 278 lb 12.8 oz (126.5 kg)   SpO2 96%   BMI 43.67 kg/m²  Estimated body mass index is 43.67 kg/m² as calculated from the following:    Height as of this encounter: 5' 7\" (1.702 m).    Weight as of this encounter: 278 lb 12.8 oz (126.5 kg).   Vital signs reviewed.Appears stated age, well groomed, in no acute distress.  Physical Exam:  GEN:  Patient is alert, awake and oriented, well developed, well nourished.  SKIN: No rashes, no skin lesion, no bruising, good turgor.  HEART:  Regular rate and rhythm, no murmurs, rubs or gallops.  LUNGS: Clear to auscultation bilterally, no rales/rhonchi/wheezing.  NEURO:  No deficit, normal gait, strength and tone, sensory intact.    ASSESSMENT AND PLAN:   1. Diabetes mellitus, new onset (HCC)  -Will add Ozempic 0.25mg weekly to Jardiance.   -Patient denies any personal or family history of medullary thyroid carcinoma or multiple endocrine neoplasia type 2 (MEN 2). Patient advised to monitor and report any symptoms including neck mass, dysphagia, dyspnea, persistent hoarseness. Patient is mentally stable and understands to report any depression, suicidal thoughts, or unusual changes in mood or behavior. Patient denies personal history of pancreatitis. Patient aware that monitoring of heart rate and response to medication is necessary and thus compliance with follow-up office visits is important. Patient understands that anti-obesity medications are contraindicated in pregnancy.  -Instructed on how to give self injections. Discussed possible side effects of Ozempic. Emphasized need to eat regularly even if not hungry. Should follow a healthy diet and exercise regularly.  -See me 3 months.   -Diabetic eye exam-previously referred.   - semaglutide  (OZEMPIC, 0.25 OR 0.5 MG/DOSE,) 2 MG/3ML Subcutaneous Solution Pen-injector; Inject 0.25 mg into the skin once a week. May increase to 0.5mg weekly after 4 weeks.  Dispense: 3 mL; Refill: 2    2. Essential hypertension, benign  -Recommended to take medications regularly and monitor BP at home, notify me if >150/90 on repeated readings.  -Low-salt diet.  -Repeat BMP today for hypokalemia on previous lab.    3. Mild persistent asthma without complication  -Recommended to find out from pharmacy why medication is not being filled. Can notify me if not covered by insurance and I can send an alternative.    4. Thyroid nodule  - US THYROID (CPT=76536); Future    5. Vitamin D deficiency  -Vitamin D low-normal. Recommended OTC supplement 5286-8581 units/day.      Meds & Refills for this Visit:  Requested Prescriptions     Signed Prescriptions Disp Refills    semaglutide (OZEMPIC, 0.25 OR 0.5 MG/DOSE,) 2 MG/3ML Subcutaneous Solution Pen-injector 3 mL 2     Sig: Inject 0.25 mg into the skin once a week. May increase to 0.5mg weekly after 4 weeks.         Problem List:  Patient Active Problem List   Diagnosis    Hot flashes    History of abdominal supracervical subtotal hysterectomy    Diabetes mellitus, new onset (HCC)    Essential hypertension, benign    Thyroid nodule    Mild persistent asthma without complication    Seasonal allergic rhinitis    Symptomatic menopausal or female climacteric states    Vitamin D deficiency    Nonspecific reaction to tuberculin skin test       Tamie Mcfarlane, SHARIFA  2/15/2024  11:18 AM

## 2024-02-20 ENCOUNTER — TELEPHONE (OUTPATIENT)
Dept: FAMILY MEDICINE CLINIC | Facility: CLINIC | Age: 64
End: 2024-02-20

## 2024-02-21 ENCOUNTER — PATIENT MESSAGE (OUTPATIENT)
Dept: FAMILY MEDICINE CLINIC | Facility: CLINIC | Age: 64
End: 2024-02-21

## 2024-02-21 DIAGNOSIS — J45.30 MILD PERSISTENT ASTHMA WITHOUT COMPLICATION (HCC): Primary | ICD-10-CM

## 2024-02-22 RX ORDER — FLUTICASONE PROPIONATE AND SALMETEROL 100; 50 UG/1; UG/1
1 POWDER RESPIRATORY (INHALATION) 2 TIMES DAILY
Qty: 3 EACH | Refills: 1 | Status: SHIPPED | OUTPATIENT
Start: 2024-02-22

## 2024-02-22 NOTE — TELEPHONE ENCOUNTER
Per covermymeds, they are not covering Advair HFA- perhaps may cover another version or Advair?       \"The patient's drug benefit plan provides coverage for other drugs which may be considered for treating your patient. Can your patient be treated with a formulary drug? Available Formulary Alternatives: fluticasone-salmeterol (except certain NDCs), Wixela Inhub, BREO ELLIPTA (except certain NDCs) [NOTE: If yes, provide your patient with a new prescription for the formulary product.]* \"      Please advise, any other alternative we can try for pt?

## 2024-02-27 ENCOUNTER — HOSPITAL ENCOUNTER (OUTPATIENT)
Dept: ULTRASOUND IMAGING | Age: 64
Discharge: HOME | End: 2024-02-27
Attending: NURSE PRACTITIONER
Payer: COMMERCIAL

## 2024-02-27 DIAGNOSIS — E04.1 THYROID NODULE: ICD-10-CM

## 2024-02-27 PROCEDURE — 76536 US EXAM OF HEAD AND NECK: CPT | Performed by: NURSE PRACTITIONER

## 2024-02-28 DIAGNOSIS — E04.1 THYROID NODULE: Primary | ICD-10-CM

## 2024-03-02 DIAGNOSIS — I10 ESSENTIAL HYPERTENSION, BENIGN: ICD-10-CM

## 2024-03-02 RX ORDER — HYDROCHLOROTHIAZIDE 25 MG/1
25 TABLET ORAL EVERY MORNING
Qty: 90 TABLET | Refills: 1 | Status: CANCELLED | OUTPATIENT
Start: 2024-03-02

## 2024-03-02 RX ORDER — NEBIVOLOL 20 MG/1
20 TABLET ORAL DAILY
Qty: 90 TABLET | Refills: 1 | Status: CANCELLED | OUTPATIENT
Start: 2024-03-02

## 2024-03-05 ENCOUNTER — PATIENT MESSAGE (OUTPATIENT)
Dept: FAMILY MEDICINE CLINIC | Facility: CLINIC | Age: 64
End: 2024-03-05

## 2024-03-11 NOTE — DISCHARGE INSTRUCTIONS
Procedure performed by      Biopsy/Aspiration of RIGHT THYROID GLAND NODULE.    DISCHARGE INSTRUCTIONS                               You may develop a sore throat after the procedure.  If necessary,                               throat lozenges may be used to relieve the discomfort.  Call your                               physician immediately if you experience increased swelling in your                                neck, difficulty breathing, or difficulty swallowing.                               DO NOT TAKE aspirin-containing products, Ibuprofen, Vitamin E, or                              blood thinning products for three (3) days after the procedure.  You may                             take Tylenol (1 or 2 tablets every 4-6 hours) for mild discomfort at the                             biopsy site. Avoid straining, heavy lifting, or strenuous physical activity                              today.  Report any bleeding at aspiration/biopsy site, redness,                             swelling, odor, discharge, pain or fever that does not lessen after one                              day, to your physician.  Resume a regular diet.  Call your physician with                             questions or test results.  Also you may contact the Radiology Nurse                             at [phone] with any additional questions or concerns.    Other: YOU MAY APPLY A COLD/ICE PACK TO THE SITE IF NEEDED FOR COMFORT.    BRING THIS SHEET WITH YOU SHOULD YOU HAVE TO VISIT AN EMERGENCY ROOM OR SEE YOUR DOCTOR IN THE NEXT 24 HOURS.

## 2024-03-12 NOTE — TELEPHONE ENCOUNTER
Name identified phone number. Left message for patient to return call to explain reason Dr. Machado is listed as pcp and not Tamie as patient only wants female primary doctor.

## 2024-03-19 ENCOUNTER — HOSPITAL ENCOUNTER (OUTPATIENT)
Dept: ULTRASOUND IMAGING | Facility: HOSPITAL | Age: 64
Discharge: HOME | End: 2024-03-19
Attending: NURSE PRACTITIONER
Payer: COMMERCIAL

## 2024-03-19 VITALS — HEART RATE: 74 BPM | DIASTOLIC BLOOD PRESSURE: 80 MMHG | SYSTOLIC BLOOD PRESSURE: 142 MMHG

## 2024-03-19 DIAGNOSIS — E04.1 THYROID NODULE: ICD-10-CM

## 2024-03-19 PROCEDURE — 88173 CYTOPATH EVAL FNA REPORT: CPT | Performed by: NURSE PRACTITIONER

## 2024-03-19 PROCEDURE — 10005 FNA BX W/US GDN 1ST LES: CPT | Performed by: NURSE PRACTITIONER

## 2024-03-19 PROCEDURE — 88172 CYTP DX EVAL FNA 1ST EA SITE: CPT | Performed by: NURSE PRACTITIONER

## 2024-03-19 PROCEDURE — 88177 CYTP FNA EVAL EA ADDL: CPT | Performed by: NURSE PRACTITIONER

## 2024-03-19 NOTE — IMAGING NOTE
1240  Pt arrived to ultrasound room #4    History taken and as follows: ALG REVIEWED,   PT STATES NO ASA / NSAIDS / BT X 5 DAYS    Procedure explained questions answered  Consent verified and obtained       1257 Scans taken by [facility] ultrasound  sonographer       1301  scans reviewed by Dr. MENDOSA    1310 Site marked  Time out taken      1311  Area cleaned sterile towels  to site. Pathology  was  notified.      1312  Lidocaine 1% 10 milligrams per ml  from kit  was given 2 ml        RIGHT THYROID NODULE   1314 FNA # 1 taken  with 25 g needle    1315 FNA # 2 taken  with 25 g needle    FNA # 3 NOT NEEDED PER PATHOLOGIST PRESENT    1320 SPECIMEN TO LAB BY PATHOLOGIST PRESENT,     1320 Procedure completed area re scanned . Area cleaned band aid to site ice pack to site.        1325  Post instructions given  verbal et written with AVS summary sheet provided to patient.  Also instructed patient to refrain from drinking or eating anything hot for several hours after biopsy  to prevent increase bleeding from occurring.    1327  Pt  discharged in stable condition.

## 2024-03-25 DIAGNOSIS — E04.1 THYROID NODULE: Primary | ICD-10-CM

## 2024-03-29 DIAGNOSIS — I10 ESSENTIAL HYPERTENSION, BENIGN: ICD-10-CM

## 2024-03-29 DIAGNOSIS — E11.9 TYPE 2 DIABETES MELLITUS WITHOUT COMPLICATION, WITHOUT LONG-TERM CURRENT USE OF INSULIN (HCC): ICD-10-CM

## 2024-04-01 RX ORDER — HYDROCHLOROTHIAZIDE 25 MG/1
25 TABLET ORAL EVERY MORNING
Qty: 90 TABLET | Refills: 1 | OUTPATIENT
Start: 2024-04-01

## 2024-04-01 RX ORDER — EMPAGLIFLOZIN 10 MG/1
10 TABLET, FILM COATED ORAL DAILY
Qty: 90 TABLET | Refills: 0 | Status: SHIPPED | OUTPATIENT
Start: 2024-04-01 | End: 2024-05-29

## 2024-04-09 ENCOUNTER — HOSPITAL ENCOUNTER (OUTPATIENT)
Dept: MAMMOGRAPHY | Age: 64
Discharge: HOME | End: 2024-04-09
Attending: OBSTETRICS & GYNECOLOGY
Payer: COMMERCIAL

## 2024-04-09 DIAGNOSIS — Z12.31 BREAST CANCER SCREENING BY MAMMOGRAM: ICD-10-CM

## 2024-04-09 PROCEDURE — 77067 SCR MAMMO BI INCL CAD: CPT | Performed by: OBSTETRICS & GYNECOLOGY

## 2024-04-09 PROCEDURE — 77063 BREAST TOMOSYNTHESIS BI: CPT | Performed by: OBSTETRICS & GYNECOLOGY

## 2024-04-18 ENCOUNTER — HOSPITAL ENCOUNTER (OUTPATIENT)
Dept: ULTRASOUND IMAGING | Facility: HOSPITAL | Age: 64
Discharge: HOME | End: 2024-04-18
Attending: OBSTETRICS & GYNECOLOGY
Payer: COMMERCIAL

## 2024-04-18 ENCOUNTER — HOSPITAL ENCOUNTER (OUTPATIENT)
Dept: MAMMOGRAPHY | Facility: HOSPITAL | Age: 64
Discharge: HOME | End: 2024-04-18
Attending: OBSTETRICS & GYNECOLOGY
Payer: COMMERCIAL

## 2024-04-18 DIAGNOSIS — R92.8 ABNORMAL MAMMOGRAM: ICD-10-CM

## 2024-04-18 PROCEDURE — 77065 DX MAMMO INCL CAD UNI: CPT | Performed by: OBSTETRICS & GYNECOLOGY

## 2024-04-18 PROCEDURE — 77061 BREAST TOMOSYNTHESIS UNI: CPT | Performed by: OBSTETRICS & GYNECOLOGY

## 2024-04-18 PROCEDURE — 76642 ULTRASOUND BREAST LIMITED: CPT | Performed by: OBSTETRICS & GYNECOLOGY

## 2024-04-18 NOTE — IMAGING NOTE
Discussed recommended breast biopsy with patient.  Patient is being recommended for stereotactic biopsy of the right  Breast  arc distortion by Dr. Parra . HAS SUPER ORDERS PLACED. Pt was offered tomorrow wanted Wed. Was provided Wed 4/24 checking in at 815 am [facility]. Significant other is Laly she has 1 son 1 dtr. She is retired .  ALLERGY TO NICKEL  Pt history discussed as below:  Pt history of biopsy: thyroid benign        Family history of cancer: mat aunt dx breast age 72 mat cousin dx breast age 55  Pt history of breast cancer: no  Hx BCP use:    no                 HRT use:    premarin 11 years just stopped 4 month ago  RECOMMENDATIONS:   STEREOTACTIC BIOPSY WITH 3D/DC RIGHT BREAST             Recommedations :                      see [facility] for dictated radiology report   Reviewed pertinent patient history, family history of cancer, and patient medications  Discussed with patient Radiology’s protocol regarding medications, as well as over-the-counter vitamin or herbal supplements, as follows:  -All herbal supplements, Vitamin E, Fish Oil    -All NSAIDs (Ibuprofen, Motrin, Advil, Aleve, or other antiinflammatory medication)  and Aspirin  81mg currently being taken    not  recommended or prescribed by  your physician  should be held for 5  days prior to biopsy.  Denies usage  -Aspirin 81 mg being taken related to a cardiac condition  or prescribed by your  physician should be held at the  direction of your physician.  Informed patient to call ordering physician for guidelines  denies usage   -Blood thinners/antiplatelet medications (Coumadin, Plavix  ect) should be held at the  direction of your physician.  Informed patient to call ordering physician for guidelines denies usage  Reviewed Stereotactic biopsy procedure, as below.  For this procedure,   stereotactic biopsy is being performed with tomosynthesis , you will sitting upright  with your breast in compression.  Mammography imaging will be used  to locate the area in question that was seen on your previous breast imaging.  The Radiologist will then inject a local numbing medication into the area and then use a needle to collect cells from the site.  A marker, or clip, will then be placed in the biopsied area.  This marker is placed so this biopsy site is able to be accurately located upon future breast imaging.  After the clip is placed,  a dressing will be placed on the biopsy site.  Additional mammography films will then be taken to assure correct placement of the marker.    Educated the patient they will be awake during this procedure and are able to drive themselves home if they wish.    Educated patient that some soreness may occur after biopsy.  Discussed use of a supportive bra and ice packs after procedure, to decrease soreness.    Discussed with patient no swimming, bathing,  hot tubs , or submerging underwater for 5 days and   until the incision is closed and healed.  Educated patient on lifting restrictions - nothing heavier than a gallon of milk for  48 hours after the procedure.      Discussed with patient that some soreness and bruising is normal after biopsy but that prolonged or increased pain and bruising should be reported to the ordering physician.  An ace wrap will be applied over bra for added support and should be left on for 24 hours post procedure.  Reviewed results process with patient and shared that pathology results will be available within 2-3 business days of their biopsy.  Discussed results will be communicated by their ordering physician unless otherwise indicated.  Educated patient that once we receive an order from their physician our radiology secretaries will be calling them to schedule their biopsy.

## 2024-04-19 NOTE — DISCHARGE INSTRUCTIONS
The Doctor (Radiologist) who performed your procedure was: DR LUZ    Place an ice pack over the biopsy site on top of your bra or on top of the ACE wrap (never apply ice directly over skin) for 10-15 minutes of every hour until bedtime for your comfort and to decrease bleeding.  Keep your sports bra or the ACE wrap (stereotactic and MRI biopsy) in place for 24 hours after your biopsy. This compression decreases bleeding and breast movement for your comfort. Wear a supportive bra for the next couple of days for comfort (sports bra for sleep).   Continue to wear, preferably, a sports bra or good supportive bra for 1 week and take off only to shower.  No baths or showers during the first 24 hours after biopsy. After this time you may take a shower. It's okay if the strips get wet but do not soak them. NO saunas, hot tubs or swimming until steri-strips fall off (approx. 5 days). This prevents infection and allows time for them to completely close and heal.  DO NOT remove the steri-strips. They will fall off in 5 days. If any type of irritation (redness, itching or blisters) develops in the area around the steri-strips, remove them gently. If the steri-strips do not fall off after 5 days, gently remove them. Keep the area clean and dry.  It is normal to have mild discomfort and bruising at the biopsy site.  You may take Tylenol as needed for discomfort, as long as you have no allergies to Tylenol. Do not take aspirin, motrin, ibuprofen or any medication containing NSAID (non-steroidal anti-inflammatory drug) product for 48 hours.  DO NOT participate in strenuous activity (aerobics, heavy lifting, housework, gardening, etc.) 48 hours after your biopsy to prevent bleeding.  You will receive results in 2-3 business days.  If you are having an MRI breast biopsy or an Ultrasound guided breast biopsy, you will be billed for the biopsy and unilateral mammogram separately.  If you have any questions about the procedure or  your results, please contact the Breast Care Coordinator Nurse at [phone].  Notify your ordering physician or primary physician for increased bleeding, pain or fever over 100. Or contact a Radiology Nurse at [phone] between 8am-4pm (after 4pm, your call will be directed to the [facility]).

## 2024-04-23 ENCOUNTER — TELEPHONE (OUTPATIENT)
Dept: MAMMOGRAPHY | Facility: HOSPITAL | Age: 64
End: 2024-04-23

## 2024-04-23 NOTE — TELEPHONE ENCOUNTER
Pt called in with questions  and concerns . Pt verbalizes being very nervous about being in alot pain during Bx. Verbalizes  had a thyroid Bx \"horrible pain I can't have pain like that again\" supportive care given. Explained in detail 2 local injections given prior to sampling plus additional 10 ml during procedure Pt informed radiologist will give more if needed but that there is maximum dose that they can not exceed. Pre instructions reviewed pt to arrive  AM.tomorrow  Supportive care given verbalizes\"feels better after talking to me\"

## 2024-04-24 ENCOUNTER — HOSPITAL ENCOUNTER (OUTPATIENT)
Dept: MAMMOGRAPHY | Facility: HOSPITAL | Age: 64
Discharge: HOME | End: 2024-04-24
Attending: OBSTETRICS & GYNECOLOGY
Payer: COMMERCIAL

## 2024-04-24 DIAGNOSIS — N64.89 DISTORTION OF CONTOUR OF BREAST: ICD-10-CM

## 2024-04-24 PROCEDURE — 88305 TISSUE EXAM BY PATHOLOGIST: CPT | Performed by: OBSTETRICS & GYNECOLOGY

## 2024-04-24 PROCEDURE — 19081 BX BREAST 1ST LESION STRTCTC: CPT | Performed by: OBSTETRICS & GYNECOLOGY

## 2024-04-24 NOTE — IMAGING NOTE
0825: PT BROUGHT BACK TO MAMMO HOLDING ROOM AND INSTRUCTED TO CHANGE INTO GOWN BY ILAN AMARO RN.    History taken and as follows: Stereotactic/tomosynthesis guided biopsy of the possible area of mammographic architectural   distortion in the RIGHT breast upper outer quadrant is recommended    PROCEDURE AND Post instructions  Given verbal et written AVS  summary sheet provided to patient. Patient verbalizes understanding and agreement.  Pt consented at 0850    Placed in chair  at  0853     0902  Dr LUZ here to explain risk and benefits of procedure. Questions answered by the physician    0903 Time out taken     SITE MARKED RIGHT   Breast    0905: SCOUTS TAKEN    0911 Chloro prep as skin prep.   Lidocaine 1% 10  Milligrams per ml 5 ml given for superficial anesthetic affect at 0912     0912 Lidocaine  1% with epinephrine  1:100,000 units for deeper anesthetic affect 15 ML given.  More images taken after local  was given.    Sampling begins at 0914    Sampling complete at  0916 Core tainer taken to be imaged.    0921 Formalin added to samples.    0917   CORK  clip inserted . Procedure completed . Pressure to site manually by nurse  and by machine compression for 10 minutes .    No active bleeding noted.  Area cleaned steri strips to site . Ice pack to site .    0925 Cores taken to pathology by LAMONTE DUQUEO TECH AID.    0933:  post clip images TO BE completed  Dressing dry and intact . Nipple markers removed by [employer] TECH Bra applied per patient. 6\" ace secured over bra by [employer], [employer] Access [employer] TECH.  Pt TO BE discharged BY SCOTT, [employer] TECH.

## 2024-04-24 NOTE — PROCEDURES
[facility]  part of [facility]  Procedure Note    [facility] Patient Status:  Outpatient    1960 MRN [id number]   Location [facility] Attending Delaney Keller MD   Hosp Day # 0 PCP David Machado MD     Procedure: Right breast stereotactic/vanessa guided biopsy    Pre-Procedure Diagnosis:  Right breast mammographic area of possible architectural distortion    Post-Procedure Diagnosis: Right breast mammographic area of possible architectural distortion    Anesthesia:  Local    Findings:  Right breast mammographic area of possible architectural distortion    Specimens: multiple cores    Blood Loss:  minimal    Tourniquet Time: none  Complications:  None  Drains:  none        Jessica Parra MD  2024       Detail Level: Detailed Recommend patient use lumavive

## 2024-04-25 ENCOUNTER — TELEPHONE (OUTPATIENT)
Dept: MAMMOGRAPHY | Facility: HOSPITAL | Age: 64
End: 2024-04-25

## 2024-04-25 NOTE — TELEPHONE ENCOUNTER
Dee Clarke is s/p biopsy .  Phoned and introduced myself as breast coordinator .  Reinforced to patient  post biopsy care and instructions .  No c/o post procedure .    Informed  and shared the pathology results as well as the recommendations from Dr Parra for her breast imaging  as follows:      Pathology results shared (see epic for dictated pathology and radiology procedure report)  and recommendations are as follows:       Pathology demonstrates benign finding and is concordant with imaging.       RECOMMENDATION:  As long as the patient's clinical breast exam remains unchanged, patient should return to annual screening mammogram schedule.            Dee Clarkeacknowledges the above and denies questions. [facility] was also instructed to perform breast self exams and if any changes  develops any changes to contact ordering  physician immediately  for re evaluation..  [facility] and agreement.

## 2024-05-09 ENCOUNTER — OFFICE VISIT (OUTPATIENT)
Dept: FAMILY MEDICINE CLINIC | Facility: CLINIC | Age: 64
End: 2024-05-09
Payer: COMMERCIAL

## 2024-05-09 VITALS
BODY MASS INDEX: 43 KG/M2 | HEART RATE: 89 BPM | WEIGHT: 274 LBS | DIASTOLIC BLOOD PRESSURE: 82 MMHG | SYSTOLIC BLOOD PRESSURE: 130 MMHG | OXYGEN SATURATION: 94 % | HEIGHT: 67 IN | TEMPERATURE: 97 F

## 2024-05-09 DIAGNOSIS — E11.9 TYPE 2 DIABETES MELLITUS WITHOUT COMPLICATION, WITHOUT LONG-TERM CURRENT USE OF INSULIN (HCC): Primary | ICD-10-CM

## 2024-05-09 DIAGNOSIS — I10 ESSENTIAL HYPERTENSION, BENIGN: ICD-10-CM

## 2024-05-09 DIAGNOSIS — E11.9 DIABETES MELLITUS, NEW ONSET (HCC): ICD-10-CM

## 2024-05-09 DIAGNOSIS — R23.2 HOT FLASHES: ICD-10-CM

## 2024-05-09 DIAGNOSIS — N95.1 SYMPTOMATIC MENOPAUSAL OR FEMALE CLIMACTERIC STATES: ICD-10-CM

## 2024-05-09 LAB — HEMOGLOBIN A1C: 6.8 % (ref 4.3–5.6)

## 2024-05-09 PROCEDURE — 83036 HEMOGLOBIN GLYCOSYLATED A1C: CPT | Performed by: NURSE PRACTITIONER

## 2024-05-09 PROCEDURE — 99214 OFFICE O/P EST MOD 30 MIN: CPT | Performed by: NURSE PRACTITIONER

## 2024-05-09 RX ORDER — DUTASTERIDE 0.5 MG/1
0.5 CAPSULE, LIQUID FILLED ORAL DAILY
COMMUNITY
Start: 2024-03-07

## 2024-05-09 RX ORDER — VENLAFAXINE HYDROCHLORIDE 37.5 MG/1
37.5 CAPSULE, EXTENDED RELEASE ORAL DAILY
Qty: 90 CAPSULE | Refills: 1 | Status: SHIPPED | OUTPATIENT
Start: 2024-05-09

## 2024-05-09 RX ORDER — SEMAGLUTIDE 0.68 MG/ML
INJECTION, SOLUTION SUBCUTANEOUS
Qty: 3 ML | Refills: 2 | Status: SHIPPED | OUTPATIENT
Start: 2024-05-09

## 2024-05-09 NOTE — PROGRESS NOTES
Chief Complaint:   Chief Complaint   Patient presents with    Diabetes     DM foot exam        HPI:   This is a 63 year old female coming in for follow-up on diabetes and blood pressure. BP elevated but improved on recheck. She reports good compliance with medications.     Diabetes: Last A1C 7.5%. She started Ozempic in addition to her Jardiance. Had some nausea/vomiting with Ozempic, but this has improved since she started eating prior to taking the medicine. Has had mild constipation but still has a bowel movement daily. She has been taking differently than prescribed-she thought she needed to repeat the 0.25mg/week dose after using the 0.5mg/dose x 2 weeks, so she has been dose cycling. Has lost about 4 lbs. Does not monitor glucose at home. She did join a gym and plans to start walking, using the elliptical, and swimming again. Diet has been OK. Has a diabetic eye exam scheduled for September, no sooner appointment available. Plans to schedule her colonoscopy as well.     Reports since stopping her hormone replacement, she has had a lot of hot flashes, night sweats, irritability, and trouble sleeping. Interested in trying a different medication for this.     Results for orders placed or performed during the hospital encounter of 04/24/24   Specimen to Pathology Tissue   Result Value Ref Range    Case Report       Surgical Pathology                                Case: [id number]                                  Authorizing Provider:  Delaney Keller MD      Collected:           04/24/2024 09:16 AM          Ordering Location:     [facility]          Received:            04/24/2024 09:50 AM                                 [facility]                                                                       Pathologist:           Rajeev Hernández MD                                                         Specimen:     Breast, right, RIGHT UPPER OUTER BREAST BIOPSY CORES      CORK CLIP                        Final Diagnosis:         Right breast, upper outer quadrant; stereotactic-guided core biopsy:   Benign breast tissue demonstrating focal sclerosing adenosis, apocrine metaplasia, and microcyst formation.  No glandular atypia or malignancy identified.        Embedded Images      Clinical Information       None provided.         Gross Description       The specimen is received in formalin labeled \"Vince, right breast upper outer-cork clip\" and consists of multiple fragments of fibrofatty tissue measuring in aggregate 2.5 x 2.3 x 0.4 cm. The specimen is submitted entirely in one cassette.    The specimen is collected from the patient and subsequently placed in formalin on 4/24/2024  at 9:16 a.m. The specimen is received in pathology at 9:41 a.m., processed in pathology at 10:32 a.m. and placed in fresh formalin at this time. The specimen is removed from formalin for further processing at 8:30 p.m.  warren Hernández M.D.      Interpretation Benign               Past Medical History:    Asthma (HCC)    Biliary calculus    COVID    Diabetes mellitus (HCC)    Exposure to TB    Family history of breast cancer    with Mat. aunt and mat. cousin    Fibroids    Hypertension    Ovarian cancer (HCC)     Past Surgical History:   Procedure Laterality Date    Hysterectomy      supracervical; fibroids + borderline mucinous ovarian    Ir uterine fibroid embolization      Laparoscopy,pelvic,biopsy      to remove IUD    Edmond biopsy stereo nodule 1 site right (cpt=19081) Right 04/24/2024    cork clip    Other surgical history      large cyst removed from back     Social History:  Social History     Socioeconomic History    Marital status: Single   Tobacco Use    Smoking status: Never    Smokeless tobacco: Never   Substance and Sexual Activity    Alcohol use: Yes     Comment: occ    Drug use: Never    Sexual activity: Yes     Partners: Male      Social Determinants of Health     Financial Resource Strain: Low Risk  (12/5/2022)    [facility]    Overall Financial Resource Strain (CARDIA)     Difficulty of Paying Living Expenses: Not hard at all   Food Insecurity: No Food Insecurity (12/5/2022)    [facility]    Hunger Vital Sign     Worried About Running Out of Food in the Last Year: Never true     Ran Out of Food in the Last Year: Never true   Transportation Needs: No Transportation Needs (12/5/2022)    [facility]    PRAPARE - Transportation     Lack of Transportation (Medical): No     Lack of Transportation (Non-Medical): No   Stress: No Stress Concern Present (12/5/2022)    [facility]    [employer] [place] [employer] - Occupational Stress Questionnaire     Feeling of Stress : Only a little    Received from [facility]    Social Connections    Received from [facility]    Housing Stability     Family History:  Family History   Problem Relation Age of Onset    Ovarian Cancer Self     Diabetes Mother     Prostate Cancer Father     Diabetes Father     Breast Cancer Maternal Aunt     Cancer Maternal Aunt     Breast Cancer Maternal Cousin     Colon Cancer Neg     Uterine Cancer Neg      Allergies:  Allergies   Allergen Reactions    Cefuroxime OTHER (SEE COMMENTS), SWELLING and UNKNOWN     Makes throat and face swell    Tongue swelling    Ace Inhibitors OTHER (SEE COMMENTS) and UNKNOWN     Makes throat and face swell    Trouble breathing and swelling of the throat   Makes throat and face swell    Chlorpheniramine-Phenylephrine UNKNOWN     Dusts up    Uncaria Tomentosa, Cats Claw OTHER (SEE COMMENTS) and UNKNOWN    Cat Hair Extract  Coughing     Swelling    Cholestatin Coughing    Milk-Related Compounds DIARRHEA    Nickel RASH     Current Meds:  Current Outpatient Medications   Medication Sig Dispense Refill    dutasteride 0.5 MG Oral Cap Take 1 capsule (0.5 mg total) by mouth daily.      venlafaxine ER 37.5 MG Oral Capsule SR 24 Hr Take 1 capsule (37.5 mg total) by mouth daily. 90 capsule 1    fluticasone-salmeterol (WIXELA INHUB) 100-50 MCG/ACT Inhalation Aerosol Powder, Breath Activated Inhale 1 puff into the lungs 2 (two) times daily. 3 each 1    JARDIANCE 10 MG Oral Tab Take 1 tablet (10 mg total) by mouth daily. 90 tablet 0    semaglutide (OZEMPIC, 0.25 OR 0.5 MG/DOSE,) 2 MG/3ML Subcutaneous Solution Pen-injector Inject 0.25 mg into the skin once a week. May increase to 0.5mg weekly after 4 weeks. 3 mL 2    albuterol 108 (90 Base) MCG/ACT Inhalation Aero Soln Inhale 2 puffs into the lungs every 6 (six) hours as needed for Wheezing. 1 each 3    amLODIPine 10 MG Oral Tab Take 1 tablet (10 mg total) by mouth nightly. 90 tablet 1    Nebivolol HCl 20 MG Oral Tab Take 1 tablet (20 mg total) by mouth daily. 90 tablet 1    hydroCHLOROthiazide 25 MG Oral Tab Take 1 tablet (25 mg total) by mouth every morning. 90 tablet 1    montelukast 10 MG Oral Tab Take 1 tablet (10 mg total) by mouth nightly. 90 tablet 1      Counseling given: No       REVIEW OF SYSTEMS:   CONSTITUTIONAL:  Denies unusual weight gain/loss, fever, chills, or fatigue. +Hot flashes, night sweats as per HPI.  CARDIOVASCULAR:  Denies chest pain, palpitations, edema, dyspnea on exertion or at rest.  RESPIRATORY:  Denies shortness of breath, wheezing, cough or sputum.  GASTROINTESTINAL:  Denies abdominal pain, nausea, vomiting, constipation, diarrhea, or blood in stool.  NEUROLOGICAL:  Denies headache, seizures, dizziness.  PSYCHIATRIC:  Irritability as per HPI. Denies suicidal thoughts.    EXAM:   /82 (BP Location: Right arm, Patient Position: Sitting, Cuff Size: large)   Pulse  89   Temp 97.3 °F (36.3 °C)   Ht 5' 7\" (1.702 m)   Wt 274 lb (124.3 kg)   SpO2 94%   BMI 42.91 kg/m²  Estimated body mass index is 42.91 kg/m² as calculated from the following:    Height as of this encounter: 5' 7\" (1.702 m).    Weight as of this encounter: 274 lb (124.3 kg).   Vital signs reviewed.Appears stated age, well groomed, in no acute distress.  Physical Exam:  GEN:  Patient is alert, awake and oriented, well developed, well nourished.  SKIN: No rashes, no skin lesion, no bruising, good turgor.  HEART:  Regular rate and rhythm, no murmurs, rubs or gallops.  LUNGS: Clear to auscultation bilterally, no rales/rhonchi/wheezing.  EXTREMITIES:  No edema, no cyanosis, no clubbing, FROM, 2+ dorsalis pedis pulses bilaterally. Capillary refill <2 seconds BL feet. No lesions/skin changes. Microfilament testing normal both feet.  NEURO:  No deficit, normal gait, strength and tone, sensory intact.    ASSESSMENT AND PLAN:   1. Type 2 diabetes mellitus without complication, without long-term current use of insulin (HCC)  -Hemoglobin A1C in the office today 6.8%, at goal.   -Continue Jardiance. Continue Ozempic at 0.5mg/week.   -Recommended intensive diet/exercise modifications.   -Diabetic eye exam scheduled. May see outside provider sooner if she prefers.   -Diabetic foot exam today normal.  -See me 6 months.  - POC Glycohemoglobin [17124]    2. Essential hypertension, benign  -BP at goal on repeat measurement. Continue current medications. Diet/exercise as above.    3. Symptomatic menopausal or female climacteric states  -Will start Effexor daily. Discussed how to take this medicine and possible side effects. Discussed that this may take 3-4 weeks to start working.   - venlafaxine ER 37.5 MG Oral Capsule SR 24 Hr; Take 1 capsule (37.5 mg total) by mouth daily.  Dispense: 90 capsule; Refill: 1    4. Hot flashes  -See above.   - venlafaxine ER 37.5 MG Oral Capsule SR 24 Hr; Take 1 capsule (37.5 mg total) by mouth  daily.  Dispense: 90 capsule; Refill: 1      Meds & Refills for this Visit:  Requested Prescriptions     Signed Prescriptions Disp Refills    venlafaxine ER 37.5 MG Oral Capsule SR 24 Hr 90 capsule 1     Sig: Take 1 capsule (37.5 mg total) by mouth daily.         Problem List:  Patient Active Problem List   Diagnosis    Hot flashes    History of abdominal supracervical subtotal hysterectomy    Diabetes mellitus, new onset (HCC)    Essential hypertension, benign    Thyroid nodule    Mild persistent asthma without complication (HCC)    Seasonal allergic rhinitis    Symptomatic menopausal or female climacteric states    Vitamin D deficiency    Nonspecific reaction to tuberculin skin test       Tamie Mcfarlane, APRN  5/9/2024  11:07 AM

## 2024-05-29 DIAGNOSIS — E11.9 TYPE 2 DIABETES MELLITUS WITHOUT COMPLICATION, WITHOUT LONG-TERM CURRENT USE OF INSULIN (HCC): ICD-10-CM

## 2024-05-29 RX ORDER — EMPAGLIFLOZIN 10 MG/1
10 TABLET, FILM COATED ORAL DAILY
Qty: 90 TABLET | Refills: 0 | Status: SHIPPED | OUTPATIENT
Start: 2024-05-29

## 2024-05-29 NOTE — TELEPHONE ENCOUNTER
A refill request was received for:  Requested Prescriptions     Pending Prescriptions Disp Refills    JARDIANCE 10 MG Oral Tab [Pharmacy Med Name: JARDIANCE 10 MG TABLET] 90 tablet 0     Sig: TAKE 1 TABLET BY MOUTH EVERY DAY     Last refill date:  4/1/24   Qty: 90 and 0   Dx: T2DM  Last office visit: 5/9/24   When is follow up due: 11/9/24       Future Appointments   Date Time Provider Department Center   9/18/2024  2:45 PM [facility]   11/11/2024 11:30 AM Tamie Mcfarlane APRN [id number] [facility]

## 2024-07-28 DIAGNOSIS — I10 ESSENTIAL HYPERTENSION, BENIGN: ICD-10-CM

## 2024-07-29 RX ORDER — HYDROCHLOROTHIAZIDE 25 MG/1
25 TABLET ORAL EVERY MORNING
Qty: 90 TABLET | Refills: 1 | Status: SHIPPED | OUTPATIENT
Start: 2024-07-29

## 2024-07-29 RX ORDER — NEBIVOLOL 20 MG/1
20 TABLET ORAL DAILY
Qty: 90 TABLET | Refills: 1 | Status: SHIPPED | OUTPATIENT
Start: 2024-07-29

## 2024-07-29 RX ORDER — AMLODIPINE BESYLATE 10 MG/1
10 TABLET ORAL NIGHTLY
Qty: 90 TABLET | Refills: 1 | Status: SHIPPED | OUTPATIENT
Start: 2024-07-29

## 2024-07-29 NOTE — TELEPHONE ENCOUNTER
A refill request was received for:  Requested Prescriptions     Pending Prescriptions Disp Refills    HYDROCHLOROTHIAZIDE 25 MG Oral Tab [Pharmacy Med Name: HYDROCHLOROTHIAZIDE 25 MG TAB] 90 tablet 1     Sig: TAKE 1 TABLET BY MOUTH EVERY DAY IN THE MORNING    AMLODIPINE 10 MG Oral Tab [Pharmacy Med Name: AMLODIPINE BESYLATE 10 MG TAB] 90 tablet 1     Sig: TAKE 1 TABLET BY MOUTH EVERY DAY AT NIGHT    NEBIVOLOL HCL 20 MG Oral Tab [Pharmacy Med Name: NEBIVOLOL 20 MG TABLET] 90 tablet 1     Sig: TAKE 1 TABLET BY MOUTH EVERY DAY     Last refill date:  02/01/2024  Qty: 90/1 refill   Dx:   Essential hypertension        Last office visit: 05/09/2024   When is follow up due: appointment schedule      For hormone prescriptions, date of last blood test for rx being requested:    Future Appointments   Date Time Provider Department Center   9/18/2024  2:45 PM Devin Voss MD [facility]   11/11/2024 11:30 AM Tamie Mcfarlane APRN [id number] [facility]

## 2024-08-10 DIAGNOSIS — E11.9 DIABETES MELLITUS, NEW ONSET (HCC): ICD-10-CM

## 2024-08-12 RX ORDER — SEMAGLUTIDE 0.68 MG/ML
INJECTION, SOLUTION SUBCUTANEOUS
Qty: 3 ML | Refills: 2 | Status: SHIPPED | OUTPATIENT
Start: 2024-08-12

## 2024-08-12 NOTE — TELEPHONE ENCOUNTER
A refill request was received for:  Requested Prescriptions     Pending Prescriptions Disp Refills    semaglutide (OZEMPIC, 0.25 OR 0.5 MG/DOSE,) 2 MG/3ML Subcutaneous Solution Pen-injector 3 mL 2     Sig: Inject 0.5mg/weekly     Last refill date:  5/9/2024  Qty: 3 mL and 2  Dx: diabetes  Last office visit: 5/9/2024  When is follow up due: 11/9/2024    Future Appointments   Date Time Provider Department Center   9/18/2024  2:45 PM [facility]   11/11/2024 11:30 AM Tamie Mcfarlane APRN [id number] [facility]

## 2024-09-08 DIAGNOSIS — E11.9 DIABETES MELLITUS, NEW ONSET (HCC): ICD-10-CM

## 2024-09-09 RX ORDER — SEMAGLUTIDE 0.68 MG/ML
INJECTION, SOLUTION SUBCUTANEOUS
Refills: 2 | OUTPATIENT
Start: 2024-09-09

## 2024-09-20 DIAGNOSIS — N95.1 SYMPTOMATIC MENOPAUSAL OR FEMALE CLIMACTERIC STATES: ICD-10-CM

## 2024-09-20 DIAGNOSIS — R23.2 HOT FLASHES: ICD-10-CM

## 2024-09-20 RX ORDER — VENLAFAXINE HYDROCHLORIDE 37.5 MG/1
37.5 CAPSULE, EXTENDED RELEASE ORAL DAILY
Qty: 90 CAPSULE | Refills: 1 | Status: CANCELLED | OUTPATIENT
Start: 2024-09-20

## 2024-10-26 DIAGNOSIS — R23.2 HOT FLASHES: ICD-10-CM

## 2024-10-26 DIAGNOSIS — N95.1 SYMPTOMATIC MENOPAUSAL OR FEMALE CLIMACTERIC STATES: ICD-10-CM

## 2024-10-26 RX ORDER — VENLAFAXINE HYDROCHLORIDE 37.5 MG/1
37.5 CAPSULE, EXTENDED RELEASE ORAL DAILY
Qty: 90 CAPSULE | Refills: 1 | Status: SHIPPED | OUTPATIENT
Start: 2024-10-26

## 2024-10-26 NOTE — TELEPHONE ENCOUNTER
A refill request was received for:  Requested Prescriptions     Pending Prescriptions Disp Refills    venlafaxine ER 37.5 MG Oral Capsule SR 24 Hr 90 capsule 1     Sig: Take 1 capsule (37.5 mg total) by mouth daily.     Last refill date:  5/9/2024  Qty: 90 - 1 Refill  Dx: Symptomatic menopausal or female climacteric states   Last office visit: 5/9/2024  When is follow up due: 11/9/2024 (scheduled)      Future Appointments   Date Time Provider Department Center   10/28/2024  1:00 PM Tamie Mcfarlane APRN [id number] [facility]

## 2024-10-28 ENCOUNTER — OFFICE VISIT (OUTPATIENT)
Dept: FAMILY MEDICINE CLINIC | Facility: CLINIC | Age: 64
End: 2024-10-28
Payer: COMMERCIAL

## 2024-10-28 VITALS
HEIGHT: 67 IN | TEMPERATURE: 97 F | OXYGEN SATURATION: 95 % | SYSTOLIC BLOOD PRESSURE: 124 MMHG | BODY MASS INDEX: 42.69 KG/M2 | HEART RATE: 83 BPM | WEIGHT: 272 LBS | DIASTOLIC BLOOD PRESSURE: 78 MMHG

## 2024-10-28 DIAGNOSIS — E11.9 CONTROLLED TYPE 2 DIABETES MELLITUS WITHOUT COMPLICATION, WITHOUT LONG-TERM CURRENT USE OF INSULIN (HCC): Primary | ICD-10-CM

## 2024-10-28 DIAGNOSIS — N95.1 SYMPTOMATIC MENOPAUSAL OR FEMALE CLIMACTERIC STATES: ICD-10-CM

## 2024-10-28 DIAGNOSIS — R23.2 HOT FLASHES: ICD-10-CM

## 2024-10-28 DIAGNOSIS — I10 ESSENTIAL HYPERTENSION, BENIGN: ICD-10-CM

## 2024-10-28 DIAGNOSIS — Z23 ENCOUNTER FOR IMMUNIZATION: ICD-10-CM

## 2024-10-28 NOTE — PROGRESS NOTES
Chief Complaint:   Chief Complaint   Patient presents with    Follow - Up     Type 2 diabetes       HPI:   This is a 64 year old female coming in for follow-up. Hx DM2, has been well-controlled with Ozempic 0.5mg weekly and Jardiance. Does not monitor glucose regularly. Due for diabetic eye exam, was scheduled with a provider but they left the practice. Effexor is working well for hot flashes from menopause.     Results for orders placed or performed in visit on 05/09/24   POC Glycohemoglobin [36977]    Collection Time: 05/09/24 11:16 AM   Result Value Ref Range    HEMOGLOBIN A1C 6.8 (A) 4.3 - 5.6 %    Cartridge Lot# [id number] Numeric    Cartridge Expiration Date 12/04/2025 Date             Past Medical History:    Asthma (HCC)    Biliary calculus    COVID    Diabetes mellitus (HCC)    Exposure to TB    Family history of breast cancer    with Mat. aunt and mat. cousin    Fibroids    Hypertension    Ovarian cancer (HCC)     Past Surgical History:   Procedure Laterality Date    Hysterectomy      supracervical; fibroids + borderline mucinous ovarian    Ir uterine fibroid embolization      Laparoscopy,pelvic,biopsy      to remove IUD    Edmond biopsy stereo nodule 1 site right (cpt=19081) Right 04/24/2024    cork clip    Other surgical history      large cyst removed from back     Social History:  Social History     Socioeconomic History    Marital status: Single   Tobacco Use    Smoking status: Never     Passive exposure: Never    Smokeless tobacco: Never   Vaping Use    Vaping status: Never Used   Substance and Sexual Activity    Alcohol use: Yes     Comment: occ    Drug use: Never    Sexual activity: Yes     Partners: Male   Other Topics Concern    Caffeine Concern No    Exercise No    Seat Belt No    Special Diet No    Stress Concern No    Weight Concern No     Social Drivers of Health     Financial Resource Strain: Low Risk  (12/5/2022)    [facility]    Overall Financial Resource Strain (CARDIA)     Difficulty of Paying Living Expenses: Not hard at all   Food Insecurity: No Food Insecurity (12/5/2022)    [facility]    Hunger Vital Sign     Worried About Running Out of Food in the Last Year: Never true     Ran Out of Food in the Last Year: Never true   Transportation Needs: No Transportation Needs (12/5/2022)    [facility]    PRAPARE - Transportation     Lack of Transportation (Medical): No     Lack of Transportation (Non-Medical): No   Stress: No Stress Concern Present (12/5/2022)    [facility]    [employer] [place] [employer] - Occupational Stress Questionnaire     Feeling of Stress : Only a little    [facility]    Social Connections    [facility]    Housing Stability     Family History:  Family History   Problem Relation Age of Onset    Ovarian Cancer Self     Diabetes Mother     Prostate Cancer Father     Diabetes Father     Breast Cancer Maternal Aunt     Cancer Maternal Aunt     Breast Cancer Maternal Cousin     Colon Cancer Neg     Uterine Cancer Neg      Allergies:  Allergies[1]  Current Meds:  Current Outpatient Medications   Medication Sig Dispense Refill    venlafaxine ER 37.5 MG Oral Capsule SR 24 Hr Take 1 capsule (37.5 mg total) by mouth daily. 90 capsule 1    semaglutide (OZEMPIC, 0.25 OR 0.5 MG/DOSE,) 2 MG/3ML Subcutaneous Solution Pen-injector Inject 0.5mg/weekly 3 mL 2    HYDROCHLOROTHIAZIDE 25 MG Oral Tab TAKE 1 TABLET BY MOUTH EVERY DAY IN THE MORNING 90 tablet 1    AMLODIPINE 10 MG Oral Tab TAKE 1 TABLET BY MOUTH EVERY DAY AT NIGHT 90 tablet 1    NEBIVOLOL HCL 20 MG Oral Tab TAKE 1 TABLET  BY MOUTH EVERY DAY 90 tablet 1    JARDIANCE 10 MG Oral Tab TAKE 1 TABLET BY MOUTH EVERY DAY 90 tablet 0    dutasteride 0.5 MG Oral Cap Take 1 capsule (0.5 mg total) by mouth daily.      fluticasone-salmeterol (WIXELA INHUB) 100-50 MCG/ACT Inhalation Aerosol Powder, Breath Activated Inhale 1 puff into the lungs 2 (two) times daily. 3 each 1    albuterol 108 (90 Base) MCG/ACT Inhalation Aero Soln Inhale 2 puffs into the lungs every 6 (six) hours as needed for Wheezing. 1 each 3    montelukast 10 MG Oral Tab Take 1 tablet (10 mg total) by mouth nightly. 90 tablet 1      Counseling given: Not Answered       REVIEW OF SYSTEMS:   CONSTITUTIONAL:  Denies unusual weight gain/loss, fever, chills, or fatigue.  CARDIOVASCULAR:  Denies chest pain, palpitations, edema, dyspnea on exertion or at rest.  RESPIRATORY:  Denies shortness of breath, wheezing, cough or sputum.  GASTROINTESTINAL:  Denies abdominal pain, nausea, vomiting, constipation, diarrhea, or blood in stool.  NEUROLOGICAL:  Denies headache, seizures, dizziness.    EXAM:   /78 (BP Location: Left arm, Patient Position: Sitting, Cuff Size: large)   Pulse 83   Temp 97.3 °F (36.3 °C) (Temporal)   Ht 5' 7\" (1.702 m)   Wt 272 lb (123.4 kg)   SpO2 95%   BMI 42.60 kg/m²  Estimated body mass index is 42.6 kg/m² as calculated from the following:    Height as of this encounter: 5' 7\" (1.702 m).    Weight as of this encounter: 272 lb (123.4 kg).   Vital signs reviewed.Appears stated age, well groomed, in no acute distress.  Physical Exam:  GEN:  Patient is alert, awake and oriented, well developed, well nourished.  SKIN: No rashes, no skin lesion, no bruising, good turgor.  HEART:  Regular rate and rhythm, no murmurs, rubs or gallops.  LUNGS: Clear to auscultation bilterally, no rales/rhonchi/wheezing.  NEURO:  No deficit, normal gait, strength and tone, sensory intact.    ASSESSMENT AND PLAN:   1. Controlled type 2 diabetes mellitus without complication, without  long-term current use of insulin (HCC)  -Repeat A1C ordered.  -Referred for diabetic eye exam.  -Continue same medications. Follow-up 6 months.  - Comp Metabolic Panel (14); Future  - Hemoglobin A1C; Future    2. Essential hypertension, benign  -Stable, CPM. Follow-up 6 months.    3. Symptomatic menopausal or female climacteric states  -Stable, CPM. Follow-up 6 months.    4. Hot flashes  -See above.    5. Encounter for immunization  -Declines Shingrix.  - FLULAVAL INFLUENZA VACCINE QUAD PRESERVATIVE FREE 0.5 ML      Meds & Refills for this Visit:  Requested Prescriptions      No prescriptions requested or ordered in this encounter         Problem List:  Patient Active Problem List   Diagnosis    Hot flashes    History of abdominal supracervical subtotal hysterectomy    Controlled type 2 diabetes mellitus without complication, without long-term current use of insulin (HCC)    Essential hypertension, benign    Thyroid nodule    Mild persistent asthma without complication (HCC)    Seasonal allergic rhinitis    Symptomatic menopausal or female climacteric states    Vitamin D deficiency    Nonspecific reaction to tuberculin skin test       Tamie Mcfarlane, APRN  10/28/2024  1:02 PM         [1]   Allergies  Allergen Reactions    Cefuroxime OTHER (SEE COMMENTS), SWELLING and UNKNOWN     Makes throat and face swell    Tongue swelling    Ace Inhibitors OTHER (SEE COMMENTS) and UNKNOWN     Makes throat and face swell    Trouble breathing and swelling of the throat   Makes throat and face swell    Chlorpheniramine-Phenylephrine UNKNOWN     Dusts up    Uncaria Tomentosa, Cats Claw OTHER (SEE COMMENTS) and UNKNOWN    Cat Hair Extract Coughing     Swelling    Cholestatin Coughing    Milk-Related Compounds DIARRHEA    Nickel RASH

## 2024-11-04 ENCOUNTER — LAB ENCOUNTER (OUTPATIENT)
Dept: LAB | Facility: REFERENCE LAB | Age: 64
End: 2024-11-04
Attending: NURSE PRACTITIONER
Payer: COMMERCIAL

## 2024-11-04 DIAGNOSIS — E87.6 HYPOKALEMIA: Primary | ICD-10-CM

## 2024-11-04 DIAGNOSIS — E83.52 HYPERCALCEMIA: ICD-10-CM

## 2024-11-04 DIAGNOSIS — E11.9 CONTROLLED TYPE 2 DIABETES MELLITUS WITHOUT COMPLICATION, WITHOUT LONG-TERM CURRENT USE OF INSULIN (HCC): ICD-10-CM

## 2024-11-04 LAB
ALBUMIN SERPL-MCNC: 4.8 G/DL (ref 3.2–4.8)
ALBUMIN/GLOB SERPL: 1.5 {RATIO} (ref 1–2)
ALP LIVER SERPL-CCNC: 65 U/L
ALT SERPL-CCNC: 23 U/L
ANION GAP SERPL CALC-SCNC: 8 MMOL/L (ref 0–18)
AST SERPL-CCNC: 15 U/L (ref ?–34)
BILIRUB SERPL-MCNC: 0.9 MG/DL (ref 0.2–1.1)
BUN BLD-MCNC: 14 MG/DL (ref 9–23)
BUN/CREAT SERPL: 20 (ref 10–20)
CALCIUM BLD-MCNC: 10.6 MG/DL (ref 8.7–10.4)
CHLORIDE SERPL-SCNC: 106 MMOL/L (ref 98–112)
CO2 SERPL-SCNC: 29 MMOL/L (ref 21–32)
CREAT BLD-MCNC: 0.7 MG/DL
EGFRCR SERPLBLD CKD-EPI 2021: 97 ML/MIN/1.73M2 (ref 60–?)
EST. AVERAGE GLUCOSE BLD GHB EST-MCNC: 134 MG/DL (ref 68–126)
FASTING STATUS PATIENT QL REPORTED: YES
GLOBULIN PLAS-MCNC: 3.3 G/DL (ref 2–3.5)
GLUCOSE BLD-MCNC: 127 MG/DL (ref 70–99)
HBA1C MFR BLD: 6.3 % (ref ?–5.7)
OSMOLALITY SERPL CALC.SUM OF ELEC: 298 MOSM/KG (ref 275–295)
POTASSIUM SERPL-SCNC: 3.2 MMOL/L (ref 3.5–5.1)
PROT SERPL-MCNC: 8.1 G/DL (ref 5.7–8.2)
SODIUM SERPL-SCNC: 143 MMOL/L (ref 136–145)

## 2024-11-04 PROCEDURE — 83036 HEMOGLOBIN GLYCOSYLATED A1C: CPT

## 2024-11-04 PROCEDURE — 80053 COMPREHEN METABOLIC PANEL: CPT

## 2024-11-04 PROCEDURE — 36415 COLL VENOUS BLD VENIPUNCTURE: CPT

## 2024-11-04 RX ORDER — POTASSIUM CHLORIDE 750 MG/1
10 TABLET, EXTENDED RELEASE ORAL DAILY
Qty: 7 TABLET | Refills: 0 | Status: SHIPPED | OUTPATIENT
Start: 2024-11-04 | End: 2024-11-11

## 2024-11-08 DIAGNOSIS — E11.9 TYPE 2 DIABETES MELLITUS WITHOUT COMPLICATION, WITHOUT LONG-TERM CURRENT USE OF INSULIN (HCC): ICD-10-CM

## 2024-11-20 DIAGNOSIS — E11.9 DIABETES MELLITUS, NEW ONSET (HCC): ICD-10-CM

## 2024-11-20 RX ORDER — SEMAGLUTIDE 0.68 MG/ML
INJECTION, SOLUTION SUBCUTANEOUS
Qty: 3 ML | Refills: 2 | Status: SHIPPED | OUTPATIENT
Start: 2024-11-20

## 2024-11-20 NOTE — TELEPHONE ENCOUNTER
A refill request was received for:  Requested Prescriptions     Pending Prescriptions Disp Refills    semaglutide (OZEMPIC, 0.25 OR 0.5 MG/DOSE,) 2 MG/3ML Subcutaneous Solution Pen-injector 3 mL 2     Sig: Inject 0.5mg/weekly     Last refill date:  8/12/24   Qty: 3 ml and  2   Dx: DM  Last office visit:  10/28/24       Future Appointments   Date Time Provider Department Center   4/28/2025 11:00 AM Tamie Mcfarlane APRN [id number] [facility]

## 2024-11-22 ENCOUNTER — LAB ENCOUNTER (OUTPATIENT)
Dept: LAB | Facility: HOSPITAL | Age: 64
End: 2024-11-22
Attending: NURSE PRACTITIONER
Payer: COMMERCIAL

## 2024-11-22 DIAGNOSIS — E83.52 HYPERCALCEMIA: ICD-10-CM

## 2024-11-22 DIAGNOSIS — E87.6 HYPOKALEMIA: ICD-10-CM

## 2024-11-22 LAB
ANION GAP SERPL CALC-SCNC: 8 MMOL/L (ref 0–18)
BUN BLD-MCNC: 8 MG/DL (ref 9–23)
BUN/CREAT SERPL: 12.7 (ref 10–20)
CALCIUM BLD-MCNC: 10.6 MG/DL (ref 8.7–10.4)
CHLORIDE SERPL-SCNC: 104 MMOL/L (ref 98–112)
CO2 SERPL-SCNC: 27 MMOL/L (ref 21–32)
CREAT BLD-MCNC: 0.63 MG/DL
EGFRCR SERPLBLD CKD-EPI 2021: 99 ML/MIN/1.73M2 (ref 60–?)
FASTING STATUS PATIENT QL REPORTED: NO
GLUCOSE BLD-MCNC: 108 MG/DL (ref 70–99)
OSMOLALITY SERPL CALC.SUM OF ELEC: 287 MOSM/KG (ref 275–295)
POTASSIUM SERPL-SCNC: 3.1 MMOL/L (ref 3.5–5.1)
SODIUM SERPL-SCNC: 139 MMOL/L (ref 136–145)

## 2024-11-22 PROCEDURE — 36415 COLL VENOUS BLD VENIPUNCTURE: CPT

## 2024-11-22 PROCEDURE — 80048 BASIC METABOLIC PNL TOTAL CA: CPT

## 2024-11-25 DIAGNOSIS — E87.6 HYPOKALEMIA: Primary | ICD-10-CM

## 2024-11-25 DIAGNOSIS — E83.52 HYPERCALCEMIA: ICD-10-CM

## 2024-11-25 RX ORDER — POTASSIUM CHLORIDE 750 MG/1
10 TABLET, EXTENDED RELEASE ORAL DAILY
Qty: 30 TABLET | Refills: 0 | Status: SHIPPED | OUTPATIENT
Start: 2024-11-25

## 2025-01-10 ENCOUNTER — LAB ENCOUNTER (OUTPATIENT)
Dept: LAB | Facility: HOSPITAL | Age: 65
End: 2025-01-10
Attending: NURSE PRACTITIONER
Payer: COMMERCIAL

## 2025-01-10 DIAGNOSIS — E83.52 HYPERCALCEMIA: ICD-10-CM

## 2025-01-10 DIAGNOSIS — E87.6 HYPOKALEMIA: ICD-10-CM

## 2025-01-10 DIAGNOSIS — E87.6 HYPOKALEMIA: Primary | ICD-10-CM

## 2025-01-10 LAB
ANION GAP SERPL CALC-SCNC: 10 MMOL/L (ref 0–18)
BUN BLD-MCNC: 12 MG/DL (ref 9–23)
BUN/CREAT SERPL: 17.1 (ref 10–20)
CALCIUM BLD-MCNC: 10.5 MG/DL (ref 8.7–10.4)
CHLORIDE SERPL-SCNC: 107 MMOL/L (ref 98–112)
CO2 SERPL-SCNC: 28 MMOL/L (ref 21–32)
CREAT BLD-MCNC: 0.7 MG/DL
EGFRCR SERPLBLD CKD-EPI 2021: 97 ML/MIN/1.73M2 (ref 60–?)
FASTING STATUS PATIENT QL REPORTED: NO
GLUCOSE BLD-MCNC: 187 MG/DL (ref 70–99)
OSMOLALITY SERPL CALC.SUM OF ELEC: 305 MOSM/KG (ref 275–295)
POTASSIUM SERPL-SCNC: 3.2 MMOL/L (ref 3.5–5.1)
PTH-INTACT SERPL-MCNC: 77 PG/ML (ref 18.5–88)
SODIUM SERPL-SCNC: 145 MMOL/L (ref 136–145)

## 2025-01-10 PROCEDURE — 83970 ASSAY OF PARATHORMONE: CPT

## 2025-01-10 PROCEDURE — 80048 BASIC METABOLIC PNL TOTAL CA: CPT

## 2025-01-10 PROCEDURE — 36415 COLL VENOUS BLD VENIPUNCTURE: CPT

## 2025-01-10 RX ORDER — POTASSIUM CHLORIDE 750 MG/1
10 TABLET, EXTENDED RELEASE ORAL DAILY
Qty: 90 TABLET | Refills: 0 | Status: SHIPPED | OUTPATIENT
Start: 2025-01-10

## 2025-01-24 DIAGNOSIS — E11.9 TYPE 2 DIABETES MELLITUS WITHOUT COMPLICATION, WITHOUT LONG-TERM CURRENT USE OF INSULIN (HCC): ICD-10-CM

## 2025-01-24 NOTE — TELEPHONE ENCOUNTER
A refill request was received for:  Requested Prescriptions     Pending Prescriptions Disp Refills    JARDIANCE 10 MG Oral Tab [Pharmacy Med Name: JARDIANCE 10 MG TABLET] 90 tablet 0     Sig: TAKE 1 TABLET BY MOUTH EVERY DAY     Last refill date:  11/8/24   Qty: 90 and 0   Dx: T2DM  Last office visit:  10/28/24       Future Appointments   Date Time Provider Department Center   4/28/2025 11:00 AM Tamie Mcfarlane APRN [id number] [facility]

## 2025-01-27 RX ORDER — EMPAGLIFLOZIN 10 MG/1
10 TABLET, FILM COATED ORAL DAILY
Qty: 90 TABLET | Refills: 0 | Status: SHIPPED | OUTPATIENT
Start: 2025-01-27

## 2025-02-15 DIAGNOSIS — I10 ESSENTIAL HYPERTENSION, BENIGN: ICD-10-CM

## 2025-02-17 RX ORDER — NEBIVOLOL 20 MG/1
20 TABLET ORAL DAILY
Qty: 90 TABLET | Refills: 1 | Status: SHIPPED | OUTPATIENT
Start: 2025-02-17

## 2025-02-24 DIAGNOSIS — E11.9 DIABETES MELLITUS, NEW ONSET (HCC): ICD-10-CM

## 2025-02-24 RX ORDER — SEMAGLUTIDE 0.68 MG/ML
INJECTION, SOLUTION SUBCUTANEOUS
Qty: 3 ML | Refills: 2 | Status: SHIPPED | OUTPATIENT
Start: 2025-02-24

## 2025-02-24 NOTE — TELEPHONE ENCOUNTER
A refill request was received for:  Requested Prescriptions     Pending Prescriptions Disp Refills    OZEMPIC, 0.25 OR 0.5 MG/DOSE, 2 MG/3ML Subcutaneous Solution Pen-injector [Pharmacy Med Name: OZEMPIC 0.25-0.5 MG/DOSE PEN]  2     Sig: INJECT 0.5MG/WEEKLY     Last refill date:  11/20/24   Qty: 3 ml and 2   Dx: DM  Last office visit:  10/28/24   When is follow up due: 4/28/25     Future Appointments   Date Time Provider Department Center   4/28/2025 11:00 AM Tamie Mcfarlane APRN [id number] [facility]

## 2025-03-14 DIAGNOSIS — I10 ESSENTIAL HYPERTENSION, BENIGN: ICD-10-CM

## 2025-03-14 NOTE — TELEPHONE ENCOUNTER
A refill request was received for:  Requested Prescriptions     Pending Prescriptions Disp Refills    HYDROCHLOROTHIAZIDE 25 MG Oral Tab [Pharmacy Med Name: HYDROCHLOROTHIAZIDE 25 MG TAB] 90 tablet 1     Sig: TAKE 1 TABLET BY MOUTH EVERY DAY IN THE MORNING     Patient is over due for BMP    Future Appointments   Date Time Provider Department Center   4/28/2025 11:00 AM Tamie Mcfarlane APRN [id number] [facility]

## 2025-03-15 RX ORDER — HYDROCHLOROTHIAZIDE 25 MG/1
25 TABLET ORAL EVERY MORNING
Qty: 30 TABLET | Refills: 0 | Status: SHIPPED | OUTPATIENT
Start: 2025-03-15

## 2025-03-15 NOTE — TELEPHONE ENCOUNTER
Agree with rechecking potassium now. Will send #30 to get her through but need to know that potassium is in an acceptable range.

## 2025-03-21 ENCOUNTER — LAB ENCOUNTER (OUTPATIENT)
Dept: LAB | Facility: REFERENCE LAB | Age: 65
End: 2025-03-21
Attending: NURSE PRACTITIONER
Payer: COMMERCIAL

## 2025-03-21 DIAGNOSIS — E87.6 HYPOKALEMIA: ICD-10-CM

## 2025-03-21 LAB
ANION GAP SERPL CALC-SCNC: 4 MMOL/L (ref 0–18)
BUN BLD-MCNC: 15 MG/DL (ref 9–23)
BUN/CREAT SERPL: 20 (ref 10–20)
CALCIUM BLD-MCNC: 10.3 MG/DL (ref 8.7–10.4)
CHLORIDE SERPL-SCNC: 107 MMOL/L (ref 98–112)
CO2 SERPL-SCNC: 28 MMOL/L (ref 21–32)
CREAT BLD-MCNC: 0.75 MG/DL
EGFRCR SERPLBLD CKD-EPI 2021: 89 ML/MIN/1.73M2 (ref 60–?)
FASTING STATUS PATIENT QL REPORTED: NO
GLUCOSE BLD-MCNC: 139 MG/DL (ref 70–99)
OSMOLALITY SERPL CALC.SUM OF ELEC: 291 MOSM/KG (ref 275–295)
POTASSIUM SERPL-SCNC: 3.7 MMOL/L (ref 3.5–5.1)
SODIUM SERPL-SCNC: 139 MMOL/L (ref 136–145)

## 2025-03-21 PROCEDURE — 36415 COLL VENOUS BLD VENIPUNCTURE: CPT

## 2025-03-21 PROCEDURE — 80048 BASIC METABOLIC PNL TOTAL CA: CPT

## 2025-04-02 DIAGNOSIS — I10 ESSENTIAL HYPERTENSION, BENIGN: ICD-10-CM

## 2025-04-02 NOTE — TELEPHONE ENCOUNTER
A refill request was received for:  Requested Prescriptions     Pending Prescriptions Disp Refills    hydroCHLOROthiazide 25 MG Oral Tab 30 tablet 0     Sig: Take 1 tablet (25 mg total) by mouth every morning.     Last refill date:  3/15/2025  Qty: 30 tablets and 0  Dx: hypertension  Last office visit: 10/28/2024  When is follow up due: 4/28/2025      Future Appointments   Date Time Provider Department Center   4/28/2025 11:00 AM Tamie Mcfarlane APRN [id number] [facility]

## 2025-04-03 RX ORDER — HYDROCHLOROTHIAZIDE 25 MG/1
25 TABLET ORAL EVERY MORNING
Qty: 30 TABLET | Refills: 0 | Status: SHIPPED | OUTPATIENT
Start: 2025-04-03

## 2025-04-08 DIAGNOSIS — E87.6 HYPOKALEMIA: ICD-10-CM

## 2025-04-08 RX ORDER — POTASSIUM CHLORIDE 750 MG/1
10 TABLET, EXTENDED RELEASE ORAL DAILY
Qty: 90 TABLET | Refills: 0 | Status: SHIPPED | OUTPATIENT
Start: 2025-04-08

## 2025-04-08 NOTE — TELEPHONE ENCOUNTER
A refill request was received for:  Requested Prescriptions     Pending Prescriptions Disp Refills    POTASSIUM CHLORIDE ER 10 MEQ Oral Tab CR [Pharmacy Med Name: POTASSIUM CL ER 10 MEQ TAB WAX] 90 tablet 0     Sig: TAKE 1 TABLET BY MOUTH EVERY DAY     Last refill date:  1/10/25   Qty: 90  Dx: hypokalemia   Last office visit: 10/28/24   When is follow up due: 4/28/25     Future Appointments   Date Time Provider Department Center   4/28/2025 11:00 AM Tamie Mcfarlane APRN [id number]

## 2025-04-27 DIAGNOSIS — N95.1 SYMPTOMATIC MENOPAUSAL OR FEMALE CLIMACTERIC STATES: ICD-10-CM

## 2025-04-27 DIAGNOSIS — R23.2 HOT FLASHES: ICD-10-CM

## 2025-04-27 DIAGNOSIS — I10 ESSENTIAL HYPERTENSION, BENIGN: ICD-10-CM

## 2025-04-27 DIAGNOSIS — E11.9 TYPE 2 DIABETES MELLITUS WITHOUT COMPLICATION, WITHOUT LONG-TERM CURRENT USE OF INSULIN (HCC): ICD-10-CM

## 2025-04-28 ENCOUNTER — OFFICE VISIT (OUTPATIENT)
Dept: FAMILY MEDICINE CLINIC | Facility: CLINIC | Age: 65
End: 2025-04-28
Payer: COMMERCIAL

## 2025-04-28 VITALS
BODY MASS INDEX: 42.6 KG/M2 | DIASTOLIC BLOOD PRESSURE: 70 MMHG | TEMPERATURE: 98 F | WEIGHT: 271.38 LBS | OXYGEN SATURATION: 96 % | SYSTOLIC BLOOD PRESSURE: 122 MMHG | HEIGHT: 67 IN | HEART RATE: 64 BPM

## 2025-04-28 DIAGNOSIS — Z12.11 SCREENING FOR MALIGNANT NEOPLASM OF COLON: ICD-10-CM

## 2025-04-28 DIAGNOSIS — D22.9 ATYPICAL NEVI: ICD-10-CM

## 2025-04-28 DIAGNOSIS — E11.9 CONTROLLED TYPE 2 DIABETES MELLITUS WITHOUT COMPLICATION, WITHOUT LONG-TERM CURRENT USE OF INSULIN (HCC): ICD-10-CM

## 2025-04-28 DIAGNOSIS — I10 ESSENTIAL HYPERTENSION, BENIGN: ICD-10-CM

## 2025-04-28 DIAGNOSIS — Z12.31 SCREENING MAMMOGRAM, ENCOUNTER FOR: ICD-10-CM

## 2025-04-28 DIAGNOSIS — N95.1 SYMPTOMATIC MENOPAUSAL OR FEMALE CLIMACTERIC STATES: ICD-10-CM

## 2025-04-28 DIAGNOSIS — J45.30 MILD PERSISTENT ASTHMA WITHOUT COMPLICATION (HCC): Primary | ICD-10-CM

## 2025-04-28 DIAGNOSIS — R23.2 HOT FLASHES: ICD-10-CM

## 2025-04-28 DIAGNOSIS — E04.1 THYROID NODULE: ICD-10-CM

## 2025-04-28 PROCEDURE — 99214 OFFICE O/P EST MOD 30 MIN: CPT | Performed by: NURSE PRACTITIONER

## 2025-04-28 RX ORDER — VENLAFAXINE HYDROCHLORIDE 37.5 MG/1
37.5 CAPSULE, EXTENDED RELEASE ORAL DAILY
Qty: 90 CAPSULE | Refills: 1 | Status: SHIPPED | OUTPATIENT
Start: 2025-04-28

## 2025-04-28 RX ORDER — VENLAFAXINE HYDROCHLORIDE 37.5 MG/1
37.5 CAPSULE, EXTENDED RELEASE ORAL DAILY
Qty: 90 CAPSULE | Refills: 1 | OUTPATIENT
Start: 2025-04-28

## 2025-04-28 RX ORDER — EMPAGLIFLOZIN 10 MG/1
10 TABLET, FILM COATED ORAL DAILY
Qty: 90 TABLET | Refills: 0 | OUTPATIENT
Start: 2025-04-28

## 2025-04-28 RX ORDER — HYDROCHLOROTHIAZIDE 25 MG/1
25 TABLET ORAL EVERY MORNING
Qty: 90 TABLET | Refills: 1 | Status: SHIPPED | OUTPATIENT
Start: 2025-04-28

## 2025-04-28 RX ORDER — AMLODIPINE BESYLATE 10 MG/1
10 TABLET ORAL NIGHTLY
Qty: 90 TABLET | Refills: 1 | Status: SHIPPED | OUTPATIENT
Start: 2025-04-28

## 2025-04-28 RX ORDER — AMLODIPINE BESYLATE 10 MG/1
10 TABLET ORAL NIGHTLY
Qty: 90 TABLET | Refills: 1 | OUTPATIENT
Start: 2025-04-28

## 2025-04-28 RX ORDER — ALBUTEROL SULFATE 90 UG/1
2 INHALANT RESPIRATORY (INHALATION) EVERY 6 HOURS PRN
Qty: 1 EACH | Refills: 3 | Status: SHIPPED | OUTPATIENT
Start: 2025-04-28

## 2025-04-28 NOTE — PROGRESS NOTES
Chief Complaint:   Chief Complaint   Patient presents with    Follow - Up       HPI:   This is a 64 year old female coming in for follow-up. Hx asthma, this has been well-controlled. She notices she gets symptoms from dust, has been doing spring cleaning recently. Uses her inhalers and Singulair with good results. Hx HTN, BP at goal. DM 2 well-controlled. Would like to increase Ozempic dose to see if she can lose some weight. Has been working on decreasing red meat in diet, eating more vegetables, trying to make food boiled or grilled rather than fried. Not exercising.     Hx thyroid nodule, due to repeat US now. Menopausal symptoms stable on Effexor. Reports a mole on R upper arm and back of L calf. R arm mole present x lifetime, leg mole may be newer. Reports she scratched it last week and it bled. Due for mammogram, diabetic eye exam, colon cancer screening.    Results for orders placed or performed in visit on 03/21/25   Basic Metabolic Panel (8)    Collection Time: 03/21/25 11:29 AM   Result Value Ref Range    Glucose 139 (H) 70 - 99 mg/dL    Sodium 139 136 - 145 mmol/L    Potassium 3.7 3.5 - 5.1 mmol/L    Chloride 107 98 - 112 mmol/L    CO2 28.0 21.0 - 32.0 mmol/L    Anion Gap 4 0 - 18 mmol/L    BUN 15 9 - 23 mg/dL    Creatinine 0.75 0.55 - 1.02 mg/dL    BUN/CREA Ratio 20.0 10.0 - 20.0    Calcium, Total 10.3 8.7 - 10.4 mg/dL    Calculated Osmolality 291 275 - 295 mOsm/kg    eGFR-Cr 89 >=60 mL/min/1.73m2    Patient Fasting for BMP? No              Past Medical History[1]  Past Surgical History[2]  Social History:  Short Social Hx on File[3]  Family History:  Family History[4]  Allergies:  Allergies[5]  Current Meds:  Current Medications[6]   Counseling given: Not Answered       REVIEW OF SYSTEMS:   CONSTITUTIONAL:  Denies unusual weight gain/loss, fever, chills, or fatigue.  INTEGUMENTARY:  See HPI.  CARDIOVASCULAR:  Denies chest pain, palpitations, edema, dyspnea on exertion or at rest.  RESPIRATORY:  Denies  shortness of breath, wheezing, cough or sputum.  GASTROINTESTINAL:  Denies abdominal pain, nausea, vomiting, constipation, diarrhea, or blood in stool.  NEUROLOGICAL:  Denies headache, seizures, dizziness.    EXAM:   /70 (BP Location: Right arm, Patient Position: Sitting, Cuff Size: large)   Pulse 64   Temp 97.6 °F (36.4 °C) (Oral)   Ht 5' 7\" (1.702 m)   Wt 271 lb 6.4 oz (123.1 kg)   SpO2 96%   BMI 42.51 kg/m²  Estimated body mass index is 42.51 kg/m² as calculated from the following:    Height as of this encounter: 5' 7\" (1.702 m).    Weight as of this encounter: 271 lb 6.4 oz (123.1 kg).   Vital signs reviewed.Appears stated age, well groomed, in no acute distress.  Physical Exam:  GEN:  Patient is alert, awake and oriented, well developed, well nourished.  NECK: Supple, no CLAD, no thyromegaly.  SKIN: Well-circumscribed raised darker discolored area on R bicep, approx. 8mm diameter, non-tender. Approx. 6mm diameter well-circumscribed raised, dark discolored area on back of L calf with scabbed area present.   HEART:  Regular rate and rhythm, no murmurs, rubs or gallops.  LUNGS: Clear to auscultation bilterally, no rales/rhonchi/wheezing.  EXTREMITIES:  No edema, no cyanosis, no clubbing, FROM, 2+ dorsalis pedis pulses bilaterally. Capillary refill <2 seconds BL feet. No wounds visible, skin c/d/I. Monofilament testing negative BL feet.  NEURO:  No deficit, normal gait, strength and tone, sensory intact.    ASSESSMENT AND PLAN:   1. Mild persistent asthma without complication (HCC)  -Stable, CPM. Follow-up 6 months.  - albuterol 108 (90 Base) MCG/ACT Inhalation Aero Soln; Inhale 2 puffs into the lungs every 6 (six) hours as needed for Wheezing.  Dispense: 1 each; Refill: 3    2. Essential hypertension, benign  -Stable, CPM. Follow-up 6 months.  - amLODIPine 10 MG Oral Tab; Take 1 tablet (10 mg total) by mouth nightly.  Dispense: 90 tablet; Refill: 1  - hydroCHLOROthiazide 25 MG Oral Tab; Take 1 tablet  (25 mg total) by mouth every morning.  Dispense: 90 tablet; Refill: 1  - Comp Metabolic Panel (14); Future    3. Controlled type 2 diabetes mellitus without complication, without long-term current use of insulin (HCC)  -Stable, CPM. Patient would like to increase Ozempic for weight purposes. Will increase now, check A1C May/June. Diabetic urine test due. Side effects from Ozempic to report reviewed with patient.  -Continue Jardiance for now, consider stopping in future if diabetes control continues to improve.  -Diabetic eye exam due now, referred.  -Diabetic foot exam done today, normal.  -Diet and exercise discussed at length.  - empagliflozin (JARDIANCE) 10 MG Oral Tab; Take 1 tablet (10 mg total) by mouth daily.  Dispense: 90 tablet; Refill: 1  - semaglutide 4 MG/3ML Subcutaneous Solution Pen-injector; Inject 1 mg into the skin once a week.  Dispense: 3 mL; Refill: 2  - Comp Metabolic Panel (14); Future  - Hemoglobin A1C; Future  - Microalb/Creat Ratio, Random Urine; Future  - Diabetic Retinopathy Exam  OU - Both Eyes; Future    4. Thyroid nodule  -Repeat US now.  - US THYROID (CPT=76536); Future    5. Symptomatic menopausal or female climacteric states  -Stable, CPM.  - venlafaxine ER 37.5 MG Oral Capsule SR 24 Hr; Take 1 capsule (37.5 mg total) by mouth daily.  Dispense: 90 capsule; Refill: 1    6. Hot flashes  - venlafaxine ER 37.5 MG Oral Capsule SR 24 Hr; Take 1 capsule (37.5 mg total) by mouth daily.  Dispense: 90 capsule; Refill: 1    7. Screening mammogram, encounter for  -Due now, patient to schedule.  - [facility] DC 2D+3D SCREENING BILAT (CPT=77067/71993); Future    8. Screening for malignant neoplasm of colon  -Patient to schedule.  - Gastro Referral - In Network    9. Atypical nevi  -Referred to dermatology.  - Derm Referral - In Network      Meds & Refills for this Visit:  Requested Prescriptions     Signed Prescriptions Disp Refills    albuterol 108 (90 Base) MCG/ACT Inhalation Aero Soln 1 each 3      Sig: Inhale 2 puffs into the lungs every 6 (six) hours as needed for Wheezing.    empagliflozin (JARDIANCE) 10 MG Oral Tab 90 tablet 1     Sig: Take 1 tablet (10 mg total) by mouth daily.    amLODIPine 10 MG Oral Tab 90 tablet 1     Sig: Take 1 tablet (10 mg total) by mouth nightly.    hydroCHLOROthiazide 25 MG Oral Tab 90 tablet 1     Sig: Take 1 tablet (25 mg total) by mouth every morning.    venlafaxine ER 37.5 MG Oral Capsule SR 24 Hr 90 capsule 1     Sig: Take 1 capsule (37.5 mg total) by mouth daily.    semaglutide 4 MG/3ML Subcutaneous Solution Pen-injector 3 mL 2     Sig: Inject 1 mg into the skin once a week.         Problem List:  Problem List[7]    Tamie Mcfarlane, APRN  4/28/2025  11:28 AM         [1]   Past Medical History:   Asthma (HCC)    Biliary calculus    COVID    Diabetes mellitus (HCC)    Exposure to TB    Family history of breast cancer    with Mat. aunt and mat. cousin    Fibroids    Hypertension    Ovarian cancer (HCC)   [2]   Past Surgical History:  Procedure Laterality Date    Hysterectomy      supracervical; fibroids + borderline mucinous ovarian    Ir uterine fibroid embolization      Laparoscopy,pelvic,biopsy      to remove IUD    [place] biopsy stereo nodule 1 site right (cpt=19081) Right 04/24/2024    cork clip    Other surgical history      large cyst removed from back   [3]   Social History  Socioeconomic History    Marital status: Single   Tobacco Use    Smoking status: Never     Passive exposure: Never    Smokeless tobacco: Never   Vaping Use    Vaping status: Never Used   Substance and Sexual Activity    Alcohol use: Yes     Comment: occ    Drug use: Never    Sexual activity: Yes     Partners: Male   Other Topics Concern    Caffeine Concern No    Exercise No    Seat Belt No    Special Diet No    Stress Concern No    Weight Concern No     Social Drivers of Health     Food Insecurity: No Food Insecurity (4/28/2025)    NCSS - Food Insecurity     Worried About Running Out of Food in the  Last Year: No     Ran Out of Food in the Last Year: No   Transportation Needs: No Transportation Needs (4/28/2025)    NCSS - Transportation     Lack of Transportation: No   Stress: No Stress Concern Present (12/5/2022)    Received from [facility] Occupational [facility] - Occupational Stress Questionnaire     Feeling of Stress : Only a little   Housing Stability: Not At Risk (4/28/2025)    NCSS - Housing/Utilities     Has Housing: Yes     Worried About Losing Housing: No     Unable to Get Utilities: No   [4]   Family History  Problem Relation Age of Onset    Ovarian Cancer Self     Diabetes Mother     Prostate Cancer Father     Diabetes Father     Breast Cancer Maternal Aunt     Cancer Maternal Aunt     Breast Cancer Maternal Cousin     Colon Cancer Neg     Uterine Cancer Neg    [5]   Allergies  Allergen Reactions    Cefuroxime OTHER (SEE COMMENTS), SWELLING and UNKNOWN     Makes throat and face swell    Tongue swelling    Ace Inhibitors OTHER (SEE COMMENTS) and UNKNOWN     Makes throat and face swell    Trouble breathing and swelling of the throat   Makes throat and face swell    Chlorpheniramine-Phenylephrine UNKNOWN     Dusts up    Uncaria Tomentosa, Cats Claw OTHER (SEE COMMENTS) and UNKNOWN    Cat Hair Extract Coughing     Swelling    Cholestatin Coughing    Milk-Related Compounds DIARRHEA    Nickel RASH   [6]   Current Outpatient Medications   Medication Sig Dispense Refill    albuterol 108 (90 Base) MCG/ACT Inhalation Aero Soln Inhale 2 puffs into the lungs every 6 (six) hours as needed for Wheezing. 1 each 3    empagliflozin (JARDIANCE) 10 MG Oral Tab Take 1 tablet (10 mg total) by mouth daily. 90 tablet 1    amLODIPine 10 MG Oral Tab Take 1 tablet (10 mg total) by mouth nightly. 90 tablet 1    hydroCHLOROthiazide 25 MG Oral Tab Take 1 tablet (25 mg total) by mouth every morning. 90 tablet 1    venlafaxine ER 37.5 MG Oral Capsule SR 24 Hr Take 1 capsule (37.5 mg  total) by mouth daily. 90 capsule 1    semaglutide 4 MG/3ML Subcutaneous Solution Pen-injector Inject 1 mg into the skin once a week. 3 mL 2    POTASSIUM CHLORIDE ER 10 MEQ Oral Tab CR TAKE 1 TABLET BY MOUTH EVERY DAY 90 tablet 0    Nebivolol HCl 20 MG Oral Tab Take 1 tablet (20 mg total) by mouth daily. 90 tablet 1    dutasteride 0.5 MG Oral Cap Take 1 capsule (0.5 mg total) by mouth daily.      fluticasone-salmeterol (WIXELA INHUB) 100-50 MCG/ACT Inhalation Aerosol Powder, Breath Activated Inhale 1 puff into the lungs 2 (two) times daily. 3 each 1    montelukast 10 MG Oral Tab Take 1 tablet (10 mg total) by mouth nightly. 90 tablet 1   [7]   Patient Active Problem List  Diagnosis    Hot flashes    History of abdominal supracervical subtotal hysterectomy    Controlled type 2 diabetes mellitus without complication, without long-term current use of insulin (HCC)    Essential hypertension, benign    Thyroid nodule    Mild persistent asthma without complication (HCC)    Seasonal allergic rhinitis    Symptomatic menopausal or female climacteric states    Vitamin D deficiency    Nonspecific reaction to tuberculin skin test

## 2025-05-23 ENCOUNTER — LAB ENCOUNTER (OUTPATIENT)
Dept: LAB | Facility: HOSPITAL | Age: 65
End: 2025-05-23
Attending: NURSE PRACTITIONER
Payer: COMMERCIAL

## 2025-05-23 DIAGNOSIS — I10 ESSENTIAL HYPERTENSION, BENIGN: ICD-10-CM

## 2025-05-23 DIAGNOSIS — E11.9 CONTROLLED TYPE 2 DIABETES MELLITUS WITHOUT COMPLICATION, WITHOUT LONG-TERM CURRENT USE OF INSULIN (HCC): ICD-10-CM

## 2025-05-23 LAB
ALBUMIN SERPL-MCNC: 4.9 G/DL (ref 3.2–4.8)
ALBUMIN/GLOB SERPL: 1.8 {RATIO} (ref 1–2)
ALP LIVER SERPL-CCNC: 55 U/L (ref 50–130)
ALT SERPL-CCNC: 27 U/L (ref 10–49)
ANION GAP SERPL CALC-SCNC: 7 MMOL/L (ref 0–18)
AST SERPL-CCNC: 17 U/L (ref ?–34)
BILIRUB SERPL-MCNC: 0.8 MG/DL (ref 0.2–1.1)
BUN BLD-MCNC: 16 MG/DL (ref 9–23)
BUN/CREAT SERPL: 20.8 (ref 10–20)
CALCIUM BLD-MCNC: 10.1 MG/DL (ref 8.7–10.4)
CHLORIDE SERPL-SCNC: 107 MMOL/L (ref 98–112)
CO2 SERPL-SCNC: 25 MMOL/L (ref 21–32)
CREAT BLD-MCNC: 0.77 MG/DL (ref 0.55–1.02)
CREAT UR-SCNC: 158.1 MG/DL
EGFRCR SERPLBLD CKD-EPI 2021: 86 ML/MIN/1.73M2 (ref 60–?)
EST. AVERAGE GLUCOSE BLD GHB EST-MCNC: 137 MG/DL (ref 68–126)
FASTING STATUS PATIENT QL REPORTED: YES
GLOBULIN PLAS-MCNC: 2.8 G/DL (ref 2–3.5)
GLUCOSE BLD-MCNC: 139 MG/DL (ref 70–99)
HBA1C MFR BLD: 6.4 % (ref ?–5.7)
MICROALBUMIN UR-MCNC: <0.3 MG/DL
OSMOLALITY SERPL CALC.SUM OF ELEC: 291 MOSM/KG (ref 275–295)
POTASSIUM SERPL-SCNC: 3.3 MMOL/L (ref 3.5–5.1)
PROT SERPL-MCNC: 7.7 G/DL (ref 5.7–8.2)
SODIUM SERPL-SCNC: 139 MMOL/L (ref 136–145)

## 2025-05-23 PROCEDURE — 82043 UR ALBUMIN QUANTITATIVE: CPT

## 2025-05-23 PROCEDURE — 80053 COMPREHEN METABOLIC PANEL: CPT

## 2025-05-23 PROCEDURE — 83036 HEMOGLOBIN GLYCOSYLATED A1C: CPT

## 2025-05-23 PROCEDURE — 82570 ASSAY OF URINE CREATININE: CPT

## 2025-05-23 PROCEDURE — 36415 COLL VENOUS BLD VENIPUNCTURE: CPT

## 2025-05-24 ENCOUNTER — PATIENT MESSAGE (OUTPATIENT)
Dept: FAMILY MEDICINE CLINIC | Facility: CLINIC | Age: 65
End: 2025-05-24

## 2025-06-25 ENCOUNTER — NURSE ONLY (OUTPATIENT)
Facility: CLINIC | Age: 65
End: 2025-06-25

## 2025-06-25 DIAGNOSIS — Z12.11 COLON CANCER SCREENING: Primary | ICD-10-CM

## 2025-06-25 RX ORDER — NICOTINE POLACRILEX 2 MG
GUM BUCCAL
COMMUNITY

## 2025-06-25 NOTE — PROGRESS NOTES
Meets TCS criteria and appropriate to proceed with scheduling  Medications, pharmacy, and allergies reviewed.   › Age: 65  › MD preference: Dr Morgan  › Insurance:  [employer]  › Last pcp visit: 4/28/25 with SHARIFA Bess  › Last CBC: 2/1/2024  › Last FOBT/Cologuard: N/A  › H/W: 5'7\"/271 lb  › BMI: 42.5    Special comments/notes:  Recall    Last Procedure, Date, MD:   [facility]   Last diagnosis:    Recalled for (mth/yrs): 10 years   Sedation used previously:    Last Prep Used:    Quality of Prep:      Telephone Colon Screening Questionnaire Yes No   Are you currently experiencing any GI symptoms [] [x]   If yes, explain     Rectal bleeding [] [x]   Black stool [] [x]   Dysphagia or food \"feeling stuck\" when eating [] [x]   Intractable vomiting [] [x]   Unexplained weight loss [] [x]   First colonoscopy [] [x]   Family history of colon cancer [] [x]   Any issues with anesthesia [] [x]   If yes, explain      In the last 12 months, complaints of chest pain or shortness of breath  [] [x]   Referred to a cardiologist?  [] [x]   If yes, explain      Respiratory Condition: oxygen/LILLIANA/COPD [] [x]   CPAP/BiPAP     History of heart attack or stroke [] [x]   If yes, in the last 12 months?  [] []   History of devices (pacemaker/defibrillator/stent placement) [] [x]     Medication Reconciliation  Yes  No   Anticoagulation [] [x]   Diuretics Hydrochlorothiazide [x] []   ACE/ARB's/Combo [] [x]   Diabetic medication (oral/insulin) Semaglutide, Jardiance [] [x]   Weight loss medication (phentermine/vyvanse/saxsenda) [] [x]   Iron/vitamin E/herbal/multivitamin supplements Biotin [x] []   NSAID/ASA   (ex: aspirin, Ibuprofen, naproxen, meloxicam, ketorolac, celecoxib, sulindac, indomethacin)  [] [x]   Marijuana/CBD/vaping use [] [x]

## 2025-06-25 NOTE — PROGRESS NOTES
GI Staff:  TCS Colon Screening Orders    Please schedule: Colonoscopy [id number] with MAC.    Please send split dose Golytely bowel prep     Diagnosis: Colon Screening Z12.11 /      Medication adjustments:  Hold Vitamins for 2 weeks prior to the procedure.  Hold Semaglutide for 7 days prior to the procedure.  Hold Jardiance for 4 days prior to the procedure.  Hold Hydrochlorothiazide the day of the procedure.     >>>Please inform patient if new medications are started after scheduling procedure they need to call clinic to notify us.

## 2025-06-26 ENCOUNTER — PATIENT MESSAGE (OUTPATIENT)
Dept: FAMILY MEDICINE CLINIC | Facility: CLINIC | Age: 65
End: 2025-06-26

## 2025-06-26 DIAGNOSIS — E87.6 HYPOKALEMIA: ICD-10-CM

## 2025-06-26 NOTE — PROGRESS NOTES
Scheduled for: Colonoscopy [id number]    Provider Name:   Eddie    Date:  10/7/2025    Location: [facility]       Sedation:  MAC    Prep: Golytely     Meds/Allergies Reconciled?:  Physician reviewed     Diagnosis with codes:    Colon cancer screening [Z12.11]     Was patient informed to call insurance with codes (Y/N):  Yes, I confirmed [employer] insurance with the patient.    Advised Patient: Please be sure to advise our office of any insurance changes as soon as possible to avoid possible cancellation of procedure      Referral sent?:  N/A    EM or [facility] notified?:  I sent an electronic request to Endo Scheduling and received a confirmation today.      Medication Orders:  This patient verbally confirmed that she is not taking:    Iron, blood thinners, BP meds, and is not diabetic    No latex allergy, No PCN allergy and does not have a pacemaker     Misc Orders:    Hold all Vitamins/Supplements 14 days prior to procedure  Hold Ozempic 7 days prior to procedure  Hold Jardiance 3 days prior to the procedure  Hold Hydrochloriazide the day of the procedure     Further instructions given by staff:  I discussed the prep instructions with the patient which she verbally understood and is aware that I will send the instructions today via [employer].    Advised patient:    You will not be able to drive, operate machinery or make critical decisions the day of your procedure. Please make arrangements for transportation. You must have a  (age 18 or older) to accompany you, stay in the facility for the duration of your procedure and drive you home after the procedure.  You cannot use public transportation (Uber, Lyft, Taxi). The procedure involves sedation, and you will not be allowed to leave unaccompanied. Your procedure will not proceed forward if you're unable to confirm your  planned to escort you home.    Advised Patient:    [facility] requires payment of copay and any patient responsibility at the time of registration.    The [facility] requires copay and 50% of the patient responsibility at the time of service for all Esophagogastroduodenoscopy and diagnostic Colonoscopies.     They do offer payment plans and Care Credit options if unable to pay the full amount at the time of registration.     If you have any questions regarding your potential responsibility, please contact [facility] Insurance Department at [phone] option 1.    You may receive 4 bills related to your medical procedure:   [facility] (the facility)  The procedural physician  The anesthesiologist  The pathology lab (if applicable)

## 2025-06-26 NOTE — PROGRESS NOTES
Prep (Golytely) Sent per protocol to Pharmacy:       [id number] - [place], [place] - [address] [phone], [phone] [address] [address]  Phone: [phone] Fax: [phone]

## 2025-06-27 DIAGNOSIS — E87.6 HYPOKALEMIA: ICD-10-CM

## 2025-06-27 RX ORDER — POTASSIUM CHLORIDE 750 MG/1
10 TABLET, EXTENDED RELEASE ORAL DAILY
Qty: 90 TABLET | Refills: 0 | Status: CANCELLED | OUTPATIENT
Start: 2025-06-27

## 2025-06-28 RX ORDER — POTASSIUM CHLORIDE 750 MG/1
10 TABLET, EXTENDED RELEASE ORAL 2 TIMES DAILY
Qty: 180 TABLET | Refills: 0 | Status: SHIPPED | OUTPATIENT
Start: 2025-06-28

## 2025-06-28 NOTE — TELEPHONE ENCOUNTER
Outpatient Medication Detail     Disp Refills Start End    Potassium Chloride ER 10 MEQ Oral Tab  tablet 0 6/28/2025 --    Sig - Route: Take 1 tablet (10 mEq total) by mouth 2 (two) times daily. - Oral    Sent to pharmacy as: Potassium Chloride ER 10 MEQ Oral Tablet Extended Release    E-Prescribing Status: Receipt confirmed by pharmacy (6/28/2025  7:54 AM CDT)      Associated Diagnoses    Hypokalemia        Pharmacy    [id number] - [place], [place] - [address] [phone], [phone]

## 2025-07-01 ENCOUNTER — HOSPITAL ENCOUNTER (OUTPATIENT)
Dept: ULTRASOUND IMAGING | Age: 65
Discharge: HOME | End: 2025-07-01
Attending: NURSE PRACTITIONER
Payer: COMMERCIAL

## 2025-07-01 DIAGNOSIS — E04.1 THYROID NODULE: ICD-10-CM

## 2025-07-01 PROCEDURE — 76536 US EXAM OF HEAD AND NECK: CPT | Performed by: NURSE PRACTITIONER

## 2025-07-10 ENCOUNTER — LAB ENCOUNTER (OUTPATIENT)
Dept: LAB | Facility: REFERENCE LAB | Age: 65
End: 2025-07-10
Attending: NURSE PRACTITIONER
Payer: COMMERCIAL

## 2025-07-10 ENCOUNTER — HOSPITAL ENCOUNTER (OUTPATIENT)
Dept: MAMMOGRAPHY | Age: 65
Discharge: HOME | End: 2025-07-10
Attending: NURSE PRACTITIONER
Payer: COMMERCIAL

## 2025-07-10 DIAGNOSIS — E87.6 HYPOKALEMIA: ICD-10-CM

## 2025-07-10 DIAGNOSIS — Z12.31 SCREENING MAMMOGRAM, ENCOUNTER FOR: ICD-10-CM

## 2025-07-10 LAB — POTASSIUM SERPL-SCNC: 3.9 MMOL/L (ref 3.5–5.1)

## 2025-07-10 PROCEDURE — 77063 BREAST TOMOSYNTHESIS BI: CPT | Performed by: NURSE PRACTITIONER

## 2025-07-10 PROCEDURE — 84132 ASSAY OF SERUM POTASSIUM: CPT

## 2025-07-10 PROCEDURE — 36415 COLL VENOUS BLD VENIPUNCTURE: CPT

## 2025-07-10 PROCEDURE — 77067 SCR MAMMO BI INCL CAD: CPT | Performed by: NURSE PRACTITIONER

## 2025-07-13 DIAGNOSIS — E11.9 CONTROLLED TYPE 2 DIABETES MELLITUS WITHOUT COMPLICATION, WITHOUT LONG-TERM CURRENT USE OF INSULIN (HCC): ICD-10-CM

## 2025-07-22 ENCOUNTER — OFFICE VISIT (OUTPATIENT)
Dept: DERMATOLOGY CLINIC | Facility: CLINIC | Age: 65
End: 2025-07-22
Payer: COMMERCIAL

## 2025-07-22 DIAGNOSIS — L90.5 SCAR CONDITION AND FIBROSIS OF SKIN: Primary | ICD-10-CM

## 2025-07-22 DIAGNOSIS — D23.9 DERMATOFIBROMA: ICD-10-CM

## 2025-07-22 DIAGNOSIS — D22.9 MULTIPLE BENIGN NEVI: ICD-10-CM

## 2025-07-22 PROCEDURE — 99204 OFFICE O/P NEW MOD 45 MIN: CPT | Performed by: STUDENT IN AN ORGANIZED HEALTH CARE EDUCATION/TRAINING PROGRAM

## 2025-07-22 RX ORDER — CLOBETASOL PROPIONATE 0.5 MG/G
1 CREAM TOPICAL 2 TIMES DAILY
Qty: 60 G | Refills: 3 | Status: SHIPPED | OUTPATIENT
Start: 2025-07-22 | End: 2026-07-22

## 2025-07-22 NOTE — PROGRESS NOTES
New Patient    Referred by: Tamie Mcfarlane APRN [NPI: [id number]]     CHIEF COMPLAINT: Lesion of concern     HISTORY OF PRESENT ILLNESS: Dee Clarke is a 64 year old female here for evaluation of lesion of concern.    1. Growth   Location: R Upper Arm    Duration: Year   Bleeding, growing, changing?: Yes   Scaly?:No    Itchy?:No    Current treatment: none   Past treatments: none    2. Growth   Location: Upper Lip, L Lower Posterior Leg  Duration: 6 months   Bleeding, growing, changing?: No  Scaly?:No    Itchy?:No    Current treatment: none   Past treatments: none     Personal Dermatologic History  History of skin cancer: No  History of  atypical moles: No    FAMILY HISTORY:  History of melanoma: No    Past Medical History  Past Medical History[1]    REVIEW OF SYSTEMS:  Constitutional: Denies fever, chills, unintentional weight loss.   Skin as per HPI    Medications  Current Medications[2]    PHYSICAL EXAM:  General: awake, alert, no acute distress  Neuropsych: appropriate mood and affect  Eyes: Sclerae anicteric, without conjunctival injection, eyelids unremarkable  Skin: Skin exam was performed today including the following: arms, face, and L leg . Pertinent findings include:   - R upper arm with a indurated papule  - L arm with numerous indurated papules   - R cutaneous lip with a indurated papule  - Face with regular brown papules     ASSESSMENT & PLAN:  Pathophysiology of diagnoses discussed with patient.  Therapeutic options reviewed. Risks, benefits, and alternatives discussed with patient. Instructions reviewed at length.    #Dermatofibroma  -Discussed benign etiology and provided reassurance. Discussed dermatofibromas are small collections of scar tissue under the skin and are thought to be related to trauma. No treatment needed or recommended. Discussed risk of recurrence and worsening of scar/dermatofibroma with removal.  - Recommend excision with Dr. Montero     #Scar  - Reassured  regarding benign nature. No treatment necessary unless symptomatic/changing.    #Multiple benign nevi  - Reassured patient of benign nature of these lesions.   - Return for lesions that are growing, changing or symptomatic.   - Recommend daily photoprotection with broad-spectrum sunscreen, avoidance of sun during peak hours, and sun protective clothing.   - Dermoscopy was used for physical examination of pigmented lesions during today's office visit.    #Bug Bites  - clobetasol 0.05% twice daily to affected areas Monday-Friday. Take weekends off. Avoid use on face, breasts, groin, or axillae. Risks, benefits, and alternatives discussed with patient.          Return to clinic: as needed or sooner if something concerning arises     Phillip Adamson MD    By signing my name below, ICandy MA,  attest that this documentation has been prepared under the direction and in the presence of Phillip Adamson MD.   Electronically Signed: Candy PIKE MA, 7/22/2025, 11:08 AM.    IPhillip MD,  personally performed the services described in this documentation. All medical record entries made by the scribe were at my direction and in my presence.  I have reviewed the chart and agree that the record reflects my personal performance and is accurate and complete.  Phillip Adamson MD, 7/22/2025, 11:44 AM         [1]   Past Medical History:   Allergic rhinitis    Hay fever. Pollen, rag weed,    Asthma (HCC)    Biliary calculus    COVID    Diabetes mellitus (HCC)    Essential hypertension    Exposure to TB    Family history of breast cancer    with Mat. aunt and mat. cousin    Fibroids    Hypertension    Obesity    Ovarian cancer (HCC)   [2]   Current Outpatient Medications   Medication Sig Dispense Refill    semaglutide 4 MG/3ML Subcutaneous Solution Pen-injector Inject 1 mg into the skin once a week. 3 mL 2    Potassium Chloride ER 10 MEQ Oral Tab CR Take 1 tablet (10 mEq total) by mouth 2 (two) times daily. 180 tablet 0     Biotin 1 MG Oral Cap Take by mouth.      albuterol 108 (90 Base) MCG/ACT Inhalation Aero Soln Inhale 2 puffs into the lungs every 6 (six) hours as needed for Wheezing. 1 each 3    empagliflozin (JARDIANCE) 10 MG Oral Tab Take 1 tablet (10 mg total) by mouth daily. 90 tablet 1    amLODIPine 10 MG Oral Tab Take 1 tablet (10 mg total) by mouth nightly. 90 tablet 1    hydroCHLOROthiazide 25 MG Oral Tab Take 1 tablet (25 mg total) by mouth every morning. 90 tablet 1    venlafaxine ER 37.5 MG Oral Capsule SR 24 Hr Take 1 capsule (37.5 mg total) by mouth daily. 90 capsule 1    Nebivolol HCl 20 MG Oral Tab Take 1 tablet (20 mg total) by mouth daily. 90 tablet 1    dutasteride 0.5 MG Oral Cap Take 1 capsule (0.5 mg total) by mouth in the morning.      fluticasone-salmeterol (WIXELA INHUB) 100-50 MCG/ACT Inhalation Aerosol Powder, Breath Activated Inhale 1 puff into the lungs 2 (two) times daily. 3 each 1    montelukast 10 MG Oral Tab Take 1 tablet (10 mg total) by mouth nightly. 90 tablet 1

## 2025-08-26 ENCOUNTER — TELEPHONE (OUTPATIENT)
Dept: FAMILY MEDICINE CLINIC | Facility: CLINIC | Age: 65
End: 2025-08-26

## 2025-08-26 DIAGNOSIS — I10 ESSENTIAL HYPERTENSION, BENIGN: ICD-10-CM

## 2025-08-29 ENCOUNTER — PATIENT MESSAGE (OUTPATIENT)
Dept: FAMILY MEDICINE CLINIC | Facility: CLINIC | Age: 65
End: 2025-08-29

## 2025-08-29 RX ORDER — NEBIVOLOL 20 MG/1
20 TABLET ORAL DAILY
Qty: 90 TABLET | Refills: 1 | OUTPATIENT
Start: 2025-08-29

## 2025-08-29 RX ORDER — NEBIVOLOL 20 MG/1
20 TABLET ORAL DAILY
Qty: 90 TABLET | Refills: 3 | Status: SHIPPED | OUTPATIENT
Start: 2025-08-29

## (undated) NOTE — LETTER
ASTHMA ACTION PLAN for Dee Zacarias [facility]     : 1960     Date: 2024  Provider:  SHARIFA Bess  Phone for doctor or clinic: [facility], [address] [place], [place]  [address] [facility] [address]  [place] [address] [phone]           You can use the colors of a traffic light to help learn about your asthma medicines.      1. Green - Go! % of Personal Best Peak Flow Use controller medicine.   Breathing is good  No cough or wheeze  Can work and play Medicine How much to take When to take it    Advair                      2 puffs                                Twice daily  Singulair                   1 tablet                             Nightly      2. Yellow - Caution. 50-79% Personal Best Peak  Flow.  Use reliever medicine to keep an asthma attack from getting bad.   Cough  Wheezing  Tight Chest  Wake up at night Medicine How much to take When to take it    Medicines as above  Albuterol                  2 puffs                                   Every 4-6 hours as needed       Additional instructions Call office if no improvement        3. Red - Stop! Danger!  <50% Personal Best Peak  Flow. Take these medications until  Get help from a doctor   Medicine not helping  Breathing is hard and fast  Nose opens wide  Can't walk  Ribs show  Can't talk well Medicine How much to take When to take it    Medications as above and go to ER     Additional Instructions If your symptoms do not improve and you cannot contact your doctor, [facility] or call [facility] immediately!     [x] Asthma Action Plan reviewed with patient (and caregiver if necessary) and a copy of the plan was given to the patient/caregiver.   [] Asthma Action Plan reviewed with patient (and caregiver if necessary) on the phone and mailed copy to patient or submitted via [employer].     Signatures:  Provider  SHARIFA Bess   Patient Caretaker

## (undated) NOTE — LETTER
[facility] ULTRASOUND  [address] [facility] [address]  [phone]  Authorization for Imaging Procedure    I hereby authorize                                  , my physician and his/her assistants (if applicable), which may include medical students, residents, and/or fellows, to perform the following procedure and administer such anesthesia as may be determined necessary by my physician: ULTRASOUND GUIDED FINE NEEDLE ASPIRATION BIOPSY RIGHT THYROID GLAND NODULE on [facility].   2.  I recognize that during the procedure, unforeseen conditions may necessitate additional or different procedures than those listed above. I, therefore, further authorize and request that the above-named physician, assistants, or designees perform such procedures as are, in their judgment, necessary and desirable.    3.  My physician has discussed prior to my procedure the potential benefits, risks and side effects of this procedure; the likelihood of achieving goals; and potential problems that might occur during recuperation. They also discussed reasonable alternatives to the procedure, including risks, benefits, and side effects related to the alternatives and risks related to not receiving this procedure. I have had all my questions answered and I acknowledge that no guarantee has been made as to the result that may be obtained.    4.  Should the need arise during my procedure, which includes change of level of care prior to discharge, I also consent to the administration of blood and/or blood products. Further, I understand that despite careful testing and screening of blood or blood products by collecting agencies, I may still be subject to ill effects as a result of receiving a blood transfusion and/or blood products. The following are some, but not all, of the potential risks that can occur: fever and allergic reactions, hemolytic reactions, transmission of diseases such as Hepatitis,  AIDS and Cytomegalovirus (CMV) and fluid overload. In the event that I wish to have an autologous transfusion of my own blood, or a directed donor transfusion, I will discuss this with my physician.  Check only if Refusing Blood or Blood Products  I understand refusal of blood or blood products as deemed necessary by my physician may have serious consequences to my condition to include possible death. I hereby assume responsibility for my refusal and release the hospital, its personnel, and my physicians from any responsibility for the consequences of my refusal.   [  ] Patient Refuses Blood      5.  I authorize the use of any specimen, organs, tissues, body parts or foreign objects that may be removed from my body during the procedure for diagnosis, research or teaching purposes and their subsequent disposal by hospital authorities. I also authorize the release of specimen test results and/or written reports to my treating physician on the hospital medical staff or other referring or consulting physicians involved in my care, at the discretion of the Pathologist or my treating physician.    6.  I consent to the photographing or videotaping of the procedures to be performed, including appropriate portions of my body for medical, scientific, or educational purposes, provided my identity is not revealed by the pictures or by descriptive texts accompanying them. If the procedure has been photographed/videotaped, the physician will obtain the original picture, image, videotape or CD. The hospital will not be responsible for storage, release or maintenance of the picture, image, tape or CD.   7.  I consent to the presence of a  or observers in the operating room as deemed necessary by my physician or their designees.    8.  I recognize that in the event my procedure results in extended X-Ray/fluoroscopy time, I may develop a skin reaction.    9.  If I have a Do Not Attempt Resuscitation (DNAR) order in  place, that status will be suspended while in the operating room, procedural suite, and during the recovery period unless otherwise explicitly stated by me (or a person authorized to consent on my behalf). The performing physician or my attending physician will determine when the applicable recovery period ends for purposes of reinstating the DNAR order.  10.  I acknowledge that my physician has explained sedation/analgesia administration to me including the risk and benefits I consent to the administration of sedation/analgesia as may be necessary or desirable in the judgment of my physician.      I CERTIFY THAT I HAVE READ AND FULLY UNDERSTAND THE ABOVE CONSENT FOR THE PROCEDURE.   Signature of Patient: _____________________________________________________________  Responsible person in case of minor, unconscious: ____________________________________  Relationship to patient:  __________________________________________________________  Signature of Witness: _______________________________Date: _________Time: __________    Statement of Physician: My signature below affirms that prior to the time of the procedure, I have explained to the patient and/or her guardian, the risks and benefits involved in the proposed treatment and any reasonable alternative to the proposed treatment. I have also explained the risks and benefits involved in the refusal of the proposed treatment and have answered the patient's questions. If I have a significant financial interest in a co-management agreement or a significant financial interest in any product or implant, or other significant relationship used in the procedure/surgery, I have disclosed this and had a discussion with my patient.  Signature of Physician:   _________________________________Date:_____________Time:________    Patient Name: Dee Clarke : 1960  Printed: 2024   Medical Record #: [id number]

## (undated) NOTE — MR AVS SNAPSHOT
After Visit Summary   1/26/2024    Dee Clarke   MRN: [id number]           Visit Information     Date & Time  1/26/2024 12:45 PM Provider  Delaney Keller MD [facility], [place], [place] - OB/GYN Dept. Phone  [phone]      Your Vitals Were  Most recent update: 1/26/2024  1:01 PM    BP   124/78    Ht   66.5\"    Wt   278 lb 3.2 oz    BMI   44.23 kg/m²          Allergies as of 1/26/2024  Review status set to Review Complete on 1/26/2024       Noted Reaction Type Reactions    Cefuroxime 05/03/2006   Systemic OTHER (SEE COMMENTS), SWELLING, UNKNOWN    Makes throat and face swell  Tongue swelling    Ace Inhibitors 06/07/2011   Systemic OTHER (SEE COMMENTS), UNKNOWN    Makes throat and face swell  Trouble breathing and swelling of the throat   Makes throat and face swell    Chlorpheniramine-phenylephrine 09/17/2011    UNKNOWN    Dusts up    Uncaria [place], Cats Claw 09/17/2011   Systemic OTHER (SEE COMMENTS), UNKNOWN    Cat Hair Extract 10/24/2013   Systemic Coughing    Swelling    Cholestatin 10/24/2013   Systemic Coughing    Milk-related Compounds 10/24/2013   Systemic DIARRHEA      Your Current Medications        Dosage    fluticasone-salmeterol (ADVAIR HFA) 230-21 MCG/ACT Inhalation Aerosol Inhale 2 puffs into the lungs Q12H.    albuterol 108 (90 Base) MCG/ACT Inhalation Aero Soln Inhale 2 puffs into the lungs.    montelukast 10 MG Oral Tab Take 1 tablet (10 mg total) by mouth nightly.    JARDIANCE 10 MG Oral Tab Take 1 tablet (10 mg total) by mouth daily.    semaglutide (OZEMPIC, 0.25 OR 0.5 MG/DOSE,) 2 MG/3ML Subcutaneous Solution Pen-injector Inject 0.25 mg into the skin.    Nebivolol HCl 20 MG Oral Tab Take 1 tablet (20 mg total) by mouth daily.    amLODIPine 10 MG Oral Tab Take 1 tablet (10 mg total) by mouth nightly.    hydroCHLOROthiazide 25 MG Oral Tab Take 1 tablet (25 mg total) by mouth every morning.    PREMARIN 0.625 MG Oral Tab Take 1  tablet (0.625 mg total) by mouth daily.      Diagnoses for This Visit    Women's annual routine gynecological examination   [1330976]  -  Primary  Hot flashes   [580041]    History of abdominal supracervical subtotal hysterectomy   [534614]    Breast cancer screening by mammogram   [7185874]    Screening for cervical cancer   [380108]             We Ordered the Following     Normal Orders This Visit    Hpv Dna  High Risk , Thin Prep Collect [NMR1262 CUSTOM]     THINPREP PAP SMEAR ONLY [FGY6636 CUSTOM]     ThinPrep PAP Smear [HVV1768 CUSTOM]     Future Labs/Procedures Expected by Expires    Hpv Dna  High Risk , Thin Prep Collect [[id number] CUSTOM]  1/26/2024 1/26/2025    VICKIE DC 2D+3D SCREENING BILAT (CPT=77067/57512) [COMBO CPT(R)]  1/26/2024 (Approximate) 1/26/2025    ThinPrep PAP Smear [[id number] CUSTOM]  1/26/2024 1/26/2025      Future Appointments        Provider Department    2/1/2024 9:30 AM Tamie Mcfarlane [facility]      Imaging Scheduling Instructions     Around January 26, 2024   Imaging:   VICKIE DC 2D+3D SCREENING BILAT (CPT=77067/37408)           January 29, 2024      Dee Zacarias [facility]   [address] [address]     Dear Dee :    Thank you for enrolling in [facility]. Please follow the instructions below to securely access your online medical record. [facility] allows you to send messages to your doctor, view test results, renew prescriptions and request appointments.    How Do I Sign Up?  1. In your Internet browser, go to [email or web address]  2. Click on the Activate your Account if you have an activation code in the box under the *New User? section.   3. Enter your [facility] Activation Code exactly as it appears below. You will not need to use this code after you have completed the sign-up process. If you do not sign up before the expiration date, you must request a new code.    [facility] Activation Code: [email or web address]  Expires:  3/11/2024  1:49 PM    4. Enter your Zip Code and Date of Birth (mm/dd/yyyy) as indicated and click Next. You will be taken to the next sign-up page.  5. Create a ilustrum Username. This will be your [employer]ustrum login Username and cannot be changed, so think of one that is secure and easy to remember.  6. Create a [employer]rum password. You can change your password at any time.  7. Choose a Security Question and enter your Answer and click Next. This can be used at a later time if you forget your password.   8. Enter your e-mail address. You will receive e-mail notification when new information is available in ilustrum.  9. Click Sign In. You can now view your medical record.    Additional Information  If you have questions, you can call [phone] to talk to our [employer]ustrum staff. Remember, [employer] is NOT to be used for urgent needs. For medical emergencies, dial 911.    Sincerely,    Delaney Keller MD              Did you know that [facility] primary care physicians now offer Video Visits through ilustrum for adult patients for a variety of conditions such as allergies, back pain and cold symptoms? Skip the drive and waiting room and online chat with a doctor face-to-face using your web-cam enabled computer or mobile device wherever you are. Video Visits cost $50 and can be paid hassle-free using a credit, debit, or health savings card.  Not active on ilustrum? Ask us how to get signed up today!          If you receive a survey from [facility], please take a few minutes to complete it and provide feedback. We strive to deliver the best patient experience and are looking for ways to make improvements. Your feedback will help us do so. For more information on [facility], please visit [email or web address]           No text in SmartText           No text in SmartText

## (undated) NOTE — LETTER
[facility]  [address] [address]  Authorization for Imaging Procedure      I hereby authorize Dr. LUZ , my physician and his/her assistants (if applicable), which may include medical students, residents, and/or fellows, to perform the following procedure and administer such anesthesia as may be determined necessary by my physician: STEREOTACTIC  GUIDED BIOPSY WITH TOMOSYNTHESIS OF THE RIGHT BREAST WITH CLIP PLACEMENT on [facility].   2.  I recognize that during the procedure, unforeseen conditions may necessitate additional or different procedures than those listed above. I, therefore, further authorize and request that the above-named physician, assistants, or designees perform such procedures as are, in their judgment, necessary and desirable.    3.  My physician has discussed prior to my procedure the potential benefits, risks and side effects of this procedure; the likelihood of achieving goals; and potential problems that might occur during recuperation. They also discussed reasonable alternatives to the procedure, including risks, benefits, and side effects related to the alternatives and risks related to not receiving this procedure. I have had all my questions answered and I acknowledge that no guarantee has been made as to the result that may be obtained.    4.  Should the need arise during my procedure, which includes change of level of care prior to discharge, I also consent to the administration of blood and/or blood products. Further, I understand that despite careful testing and screening of blood or blood products by collecting agencies, I may still be subject to ill effects as a result of receiving a blood transfusion and/or blood products. The following are some, but not all, of the potential risks that can occur: fever and allergic reactions, hemolytic reactions, transmission of diseases such as Hepatitis,  AIDS and Cytomegalovirus (CMV) and fluid overload. In the event that I wish to have an autologous transfusion of my own blood, or a directed donor transfusion, I will discuss this with my physician.  Check only if Refusing Blood or Blood Products  I understand refusal of blood or blood products as deemed necessary by my physician may have serious consequences to my condition to include possible death. I hereby assume responsibility for my refusal and release the hospital, its personnel, and my physicians from any responsibility for the consequences of my refusal.   [  ] Patient Refuses Blood      5.  I authorize the use of any specimen, organs, tissues, body parts or foreign objects that may be removed from my body during the procedure for diagnosis, research or teaching purposes and their subsequent disposal by hospital authorities. I also authorize the release of specimen test results and/or written reports to my treating physician on the hospital medical staff or other referring or consulting physicians involved in my care, at the discretion of the Pathologist or my treating physician.    6.  I consent to the photographing or videotaping of the procedures to be performed, including appropriate portions of my body for medical, scientific, or educational purposes, provided my identity is not revealed by the pictures or by descriptive texts accompanying them. If the procedure has been photographed/videotaped, the physician will obtain the original picture, image, videotape or CD. The hospital will not be responsible for storage, release or maintenance of the picture, image, tape or CD.   7.  I consent to the presence of a  or observers in the operating room as deemed necessary by my physician or their designees.    8.  I recognize that in the event my procedure results in extended X-Ray/fluoroscopy time, I may develop a skin reaction.    9.  If I have a Do Not Attempt Resuscitation (DNAR) order in  place, that status will be suspended while in the operating room, procedural suite, and during the recovery period unless otherwise explicitly stated by me (or a person authorized to consent on my behalf). The performing physician or my attending physician will determine when the applicable recovery period ends for purposes of reinstating the DNAR order.  10.  I acknowledge that my physician has explained sedation/analgesia administration to me including the risk and benefits I consent to the administration of sedation/analgesia as may be necessary or desirable in the judgment of my physician.      I CERTIFY THAT I HAVE READ AND FULLY UNDERSTAND THE ABOVE CONSENT FOR THE PROCEDURE.   Signature of Patient: _____________________________________________________________  Responsible person in case of minor, unconscious: ____________________________________  Relationship to patient:  __________________________________________________________  Signature of Witness: _______________________________Date: _________Time: __________    Statement of Physician: My signature below affirms that prior to the time of the procedure, I have explained to the patient and/or her guardian, the risks and benefits involved in the proposed treatment and any reasonable alternative to the proposed treatment. I have also explained the risks and benefits involved in the refusal of the proposed treatment and have answered the patient's questions. If I have a significant financial interest in a co-management agreement or a significant financial interest in any product or implant, or other significant relationship used in the procedure/surgery, I have disclosed this and had a discussion with my patient.  Signature of Physician:   _________________________________Date:_____________Time:________    Patient Name: Dee Clarke : 1960  Printed: 2024   Medical Record #: [id number]